# Patient Record
Sex: FEMALE | Race: WHITE | NOT HISPANIC OR LATINO | Employment: OTHER | URBAN - METROPOLITAN AREA
[De-identification: names, ages, dates, MRNs, and addresses within clinical notes are randomized per-mention and may not be internally consistent; named-entity substitution may affect disease eponyms.]

---

## 2022-07-27 ENCOUNTER — HOSPITAL ENCOUNTER (OUTPATIENT)
Dept: RADIOLOGY | Facility: MEDICAL CENTER | Age: 67
End: 2022-07-27
Attending: PHYSICIAN ASSISTANT
Payer: MEDICARE

## 2022-07-27 ENCOUNTER — OFFICE VISIT (OUTPATIENT)
Dept: URGENT CARE | Facility: PHYSICIAN GROUP | Age: 67
End: 2022-07-27
Payer: MEDICARE

## 2022-07-27 VITALS
DIASTOLIC BLOOD PRESSURE: 84 MMHG | BODY MASS INDEX: 23.39 KG/M2 | WEIGHT: 132 LBS | HEART RATE: 87 BPM | TEMPERATURE: 97.2 F | SYSTOLIC BLOOD PRESSURE: 122 MMHG | OXYGEN SATURATION: 96 % | RESPIRATION RATE: 18 BRPM | HEIGHT: 63 IN

## 2022-07-27 DIAGNOSIS — M25.471 RIGHT ANKLE SWELLING: ICD-10-CM

## 2022-07-27 PROCEDURE — 99203 OFFICE O/P NEW LOW 30 MIN: CPT | Performed by: PHYSICIAN ASSISTANT

## 2022-07-27 PROCEDURE — 73610 X-RAY EXAM OF ANKLE: CPT | Mod: RT

## 2022-07-27 RX ORDER — DULOXETIN HYDROCHLORIDE 20 MG/1
20 CAPSULE, DELAYED RELEASE ORAL DAILY
COMMUNITY
End: 2022-08-18

## 2022-07-27 ASSESSMENT — ENCOUNTER SYMPTOMS
CHILLS: 0
FALLS: 0
NAUSEA: 0
FEVER: 0
VOMITING: 0
MYALGIAS: 0

## 2022-07-28 ENCOUNTER — TELEPHONE (OUTPATIENT)
Dept: URGENT CARE | Facility: PHYSICIAN GROUP | Age: 67
End: 2022-07-28
Payer: MEDICARE

## 2022-07-28 NOTE — TELEPHONE ENCOUNTER
I spoke with patient's  as well as patient's twin sister, Sherita Hills, this afternoon. Pt gave permission for me to speak with her sister. We reviewed the x-ray results which are unremarkable.      After further discussion Sherita relates that Sussy's health has been declining steadily for the past 10 years.  She has been experiencing persistent bloating and issues with her digestive tract.  Patient has not been previously diagnosed with a movement disorder, but Sherita states movement disturbance started 3 years ago and has been gradually worsening.    Sherita indicates that last week Sussy was experiencing bilateral lower extremity swelling as well as some pain as she was concerned that this was a sign of a more serious pathology.  These symptoms have since resolved and there were no signs of CHF, DVT on my exam yesterday.    Patient's referral has been processed and she is scheduled to follow-up with primary care on 8/5/2022 at 1:20 PM.  I asked that Sherita attend this appointment with Sussy if possible.  We also reviewed red flag signs and symptoms and ED precautions.

## 2022-07-28 NOTE — PROGRESS NOTES
"Subjective:   Sussy Platt is a 67 y.o. female who presents for Ankle Injury (Swollen right ankle)        Patient presents with her  who contributes to history.  Patient presents with concerns of right ankle swelling that began 4 days ago.  Symptoms are not worsening but are also not improving.  She does not recall twisting or injuring the ankle.  She states that it is not painful to walk on.  She denies swelling in her lower legs and pain in her lower legs.  Denies nausea, vomiting, fevers, chills, chest pain, pain with breathing denies similar symptoms in the past.  She is not taking any medications for symptoms.      Review of Systems   Constitutional: Negative for chills and fever.   Gastrointestinal: Negative for nausea and vomiting.   Musculoskeletal: Negative for falls, joint pain and myalgias.       PMH:  has no past medical history on file.  MEDS:   Current Outpatient Medications:   •  QUEtiapine Fumarate (SEROQUEL PO), Take  by mouth., Disp: , Rfl:   •  DULoxetine (CYMBALTA) 20 MG Cap DR Particles, Take 20 mg by mouth every day., Disp: , Rfl:   ALLERGIES: Not on File  SURGHX: History reviewed. No pertinent surgical history.  SOCHX:  reports that she has quit smoking. She has never used smokeless tobacco. She reports that she does not drink alcohol and does not use drugs.  FH: Family history was reviewed, no pertinent findings to report   Objective:   /84   Pulse 87   Temp 36.2 °C (97.2 °F) (Temporal)   Resp 18   Ht 1.6 m (5' 3\")   Wt 59.9 kg (132 lb)   SpO2 96%   BMI 23.38 kg/m²   Physical Exam  Vitals reviewed.   Constitutional:       General: She is not in acute distress.     Appearance: Normal appearance. She is well-developed. She is not toxic-appearing.   HENT:      Head: Normocephalic and atraumatic.      Right Ear: External ear normal.      Left Ear: External ear normal.      Nose: Nose normal.   Cardiovascular:      Rate and Rhythm: Normal rate and regular rhythm. "   Pulmonary:      Effort: Pulmonary effort is normal. No respiratory distress.      Breath sounds: No stridor.   Musculoskeletal:      Comments: Right ankle/foot:  Appearance: Minimal soft tissue edema of the lateral right ankle. No bruising, erythema, or deformity appreciated  Palpation: No TTP along medial malleolus or deltoid ligament.  No TTP of lateral malleolus, ATFL, CFL, or PTFL.  No TTP along midfoot, base of the 5th metatarsal, MTP joints, or toes.   ROM: FROM throughout  Neurovascular: 2+ dorsalis pedis and posterior tibial.  Sensation intact and equal bilaterally    Lower legs are nonedematous and nonerythematous.  Lower legs are nontender to palpation bilaterally.  Even circumference.     Skin:     General: Skin is dry.   Neurological:      Comments: Alert and oriented. Dystonic movements- baseline per pt. No slurred speech or facial droop.         Imaging:    FINDINGS:  Bone mineralization is normal. There is no evidence of fracture or dislocation. There is no soft tissue swelling seen. The ankle mortise is well-maintained.        IMPRESSION:     No evidence of acute fracture or dislocation.  Assessment/Plan:   1. Right ankle swelling  - DX-ANKLE 3+ VIEWS RIGHT; Future  - Referral to establish with [facility] PCP    Other orders  - QUEtiapine Fumarate (SEROQUEL PO); Take  by mouth.  - DULoxetine (CYMBALTA) 20 MG Cap DR Particles; Take 20 mg by mouth every day.    Edema is very minimal.  Fortunately there is no sign of fracture or dislocation on imaging.  This does not look like CHF.  This does not look like a DVT.  Cause is unclear.  We will have patient follow-up with PCP for further evaluation and management.  Patient unsure if PCP is still practicing-referral placed to establish with new PCP if necessary.      ** mentions as he is leaving that he believes patient's behavior has been abnormal lately.  He believes that her speech has changed at times.  Suspects this is been ongoing for the past  several days.  Patient states that she is feeling well and she has not noticed changes in her speech, cognition, the way that she is walking or talking.  She denies headaches and palpitations. No CP, SOB. Patient and  advised that I recommend that patient be evaluated in the ED with any changes in speech, cognitive changes, motor or sensory changes, severe headaches, vision changes etc. We reviewed locations of local emergency rooms.    Differential diagnosis, natural history, supportive care, and indications for immediate follow-up discussed.    This does not include time spent on separately billable procedures/tests.

## 2022-07-28 NOTE — TELEPHONE ENCOUNTER
----- Message from Olivia Lopez, Med Ass't sent at 7/28/2022  1:42 PM PDT -----  Regarding: X Ray Results  Patient's spouse Michael called today 7/28/2022 at 1:30pm.  He stated they missed a call from you last night and would like a call back with patient's X-Ray results.

## 2022-08-05 ENCOUNTER — OFFICE VISIT (OUTPATIENT)
Dept: MEDICAL GROUP | Facility: PHYSICIAN GROUP | Age: 67
End: 2022-08-05
Attending: PHYSICIAN ASSISTANT
Payer: MEDICARE

## 2022-08-05 VITALS
DIASTOLIC BLOOD PRESSURE: 72 MMHG | WEIGHT: 135 LBS | OXYGEN SATURATION: 91 % | TEMPERATURE: 97.6 F | SYSTOLIC BLOOD PRESSURE: 126 MMHG | HEART RATE: 90 BPM | HEIGHT: 63 IN | BODY MASS INDEX: 23.92 KG/M2

## 2022-08-05 DIAGNOSIS — K86.1 CHRONIC PANCREATITIS, UNSPECIFIED PANCREATITIS TYPE (HCC): ICD-10-CM

## 2022-08-05 DIAGNOSIS — F31.9 BIPOLAR AFFECTIVE DISORDER, REMISSION STATUS UNSPECIFIED (HCC): ICD-10-CM

## 2022-08-05 DIAGNOSIS — Z76.89 ENCOUNTER TO ESTABLISH CARE: ICD-10-CM

## 2022-08-05 PROCEDURE — 99214 OFFICE O/P EST MOD 30 MIN: CPT | Performed by: NURSE PRACTITIONER

## 2022-08-05 RX ORDER — PANCRELIPASE 24000; 76000; 120000 [USP'U]/1; [USP'U]/1; [USP'U]/1
CAPSULE, DELAYED RELEASE PELLETS ORAL
COMMUNITY
End: 2022-11-03 | Stop reason: SDUPTHER

## 2022-08-05 RX ORDER — QUETIAPINE FUMARATE 300 MG/1
300 TABLET, FILM COATED ORAL 2 TIMES DAILY
COMMUNITY

## 2022-08-05 RX ORDER — DULOXETIN HYDROCHLORIDE 60 MG/1
CAPSULE, DELAYED RELEASE ORAL
COMMUNITY
Start: 2022-08-04

## 2022-08-05 RX ORDER — OMEPRAZOLE 20 MG/1
CAPSULE, DELAYED RELEASE ORAL
COMMUNITY
Start: 2022-08-04 | End: 2022-11-03 | Stop reason: SDUPTHER

## 2022-08-05 RX ORDER — ALBUTEROL SULFATE 90 UG/1
AEROSOL, METERED RESPIRATORY (INHALATION)
COMMUNITY
Start: 2022-07-16 | End: 2022-08-18

## 2022-08-05 NOTE — PROGRESS NOTES
Subjective:     CC:    Chief Complaint   Patient presents with   • Establish Care   • Abdominal Pain     Abd bloating for 10 years         HISTORY OF THE PRESENT ILLNESS: Patient is a 67 y.o. female, here today to establish care. Prior PCP was Dr Pearson in [place]. Her  and sister are also present for visit and add to history. Active concern today is abdominal bloating, nausea.     The below problems were discussed/reviewed at this visit:    Problem   Chronic Pancreatitis (Hcc)    States diagnosed pancreatitis about 7 years ago; currently on Creon TID, Omeprazole 20mg QD;   Continues to have symptoms of bloating, nausea, RUQ intermittent pain. Has seen several GI specialist ([place], [place], [place]). recalls EGD last year in [place]; colonoscopy some years ago in [place].  Denies alcohol/drug use currently; she is requesting further work up on persisting symptoms  - continue current medications as prescribed by GI  - new GI referral placed today  - request records from prior visits     Bipolar Disorder (Hcc)    States seeing Psychiatrist in [place] for the past 4 years; On daily cymbalta and nightly seroquel (unsure of dosing). States was told she is bipolar. There is mention of meth/alcohol use in the past. Unclear if sobriety is maintained.  - get records   - she would like to continue treatment with current psychiatrist         Patient Active Problem List   Diagnosis   • Chronic pancreatitis (HCC)   • Bipolar disorder (HCC)       No past medical history on file.     Current Outpatient Medications Ordered in Epic   Medication Sig Dispense Refill   • albuterol 108 (90 Base) MCG/ACT Aero Soln inhalation aerosol      • omeprazole (PRILOSEC) 20 MG delayed-release capsule      • quetiapine (SEROQUEL) 300 MG tablet Take 300 mg by mouth 2 times a day.     • pancrelipase, Lip-Prot-Amyl, (CREON) 05557-43824 units Cap DR Particles Take  by mouth 3 times a day with meals.     • QUEtiapine Fumarate (SEROQUEL PO)  "Take  by mouth.     • DULoxetine (CYMBALTA) 60 MG Cap DR Particles delayed-release capsule      • DULoxetine (CYMBALTA) 20 MG Cap DR Particles Take 20 mg by mouth every day.       No current Epic-ordered facility-administered medications on file.        No past surgical history on file.     Allergies:  Patient has no known allergies.    Health Maintenance: deferred for next visit    ROS:   Review of Systems   Constitutional: Negative.  Negative for fever and malaise/fatigue.   HENT: Negative.    Eyes: Negative.    Respiratory: Negative for cough, sputum production and shortness of breath.    Cardiovascular: Negative for chest pain, palpitations and leg swelling.   Gastrointestinal: Positive for abdominal pain and nausea.        Bloating   Genitourinary: Negative.    Musculoskeletal: Negative.    Neurological: Negative.    Endo/Heme/Allergies: Negative.    Psychiatric/Behavioral: Negative.        Objective:     Exam: /72 (BP Location: Left arm, Patient Position: Sitting, BP Cuff Size: Adult)   Pulse 90   Temp 36.4 °C (97.6 °F) (Temporal)   Ht 1.6 m (5' 3\")   Wt 61.2 kg (135 lb)   SpO2 91%  Body mass index is 23.91 kg/m².    Physical Exam  Constitutional:       Appearance: Normal appearance.   Cardiovascular:      Rate and Rhythm: Normal rate and regular rhythm.      Pulses: Normal pulses.      Heart sounds: Normal heart sounds.   Pulmonary:      Effort: Pulmonary effort is normal.      Breath sounds: Normal breath sounds.   Abdominal:      General: There is distension.      Palpations: There is no mass.      Tenderness: There is abdominal tenderness. There is no guarding or rebound.      Hernia: No hernia is present.      Comments: Tender deep palpation right abdomen   Musculoskeletal:         General: Normal range of motion.      Cervical back: Normal range of motion and neck supple.   Skin:     General: Skin is warm and dry.   Neurological:      General: No focal deficit present.      Mental Status: She " is alert and oriented to person, place, and time.   Psychiatric:         Mood and Affect: Mood normal.         Behavior: Behavior normal.         Thought Content: Thought content normal.         Judgment: Judgment normal.         Assessment & Plan:   67 y.o. female with the following -    Problem List Items Addressed This Visit     Chronic pancreatitis (HCC)    Relevant Medications    omeprazole (PRILOSEC) 20 MG delayed-release capsule    pancrelipase, Lip-Prot-Amyl, (CREON) 48095-85979 units Cap DR Particles    Other Relevant Orders    Referral to Gastroenterology    Bipolar disorder (HCC)      Other Visit Diagnoses     Encounter to establish care            Return in about 1 week (around 8/12/2022) for review external records, order labs, GI f-up, COPD.    Please note that this dictation was created using voice recognition software. I have made every reasonable attempt to correct obvious errors, but I expect that there are errors of grammar and possibly content that I did not discover before finalizing the note.

## 2022-08-06 PROBLEM — F31.9 BIPOLAR DISORDER (HCC): Status: ACTIVE | Noted: 2022-08-06

## 2022-08-06 PROBLEM — K86.1 CHRONIC PANCREATITIS (HCC): Status: ACTIVE | Noted: 2022-08-06

## 2022-08-06 ASSESSMENT — ENCOUNTER SYMPTOMS
PSYCHIATRIC NEGATIVE: 1
MUSCULOSKELETAL NEGATIVE: 1
CONSTITUTIONAL NEGATIVE: 1
COUGH: 0
EYES NEGATIVE: 1
SHORTNESS OF BREATH: 0
NAUSEA: 1
ABDOMINAL PAIN: 1
ROS GI COMMENTS: BLOATING
NEUROLOGICAL NEGATIVE: 1
FEVER: 0
SPUTUM PRODUCTION: 0
PALPITATIONS: 0

## 2022-08-18 ENCOUNTER — OFFICE VISIT (OUTPATIENT)
Dept: MEDICAL GROUP | Facility: PHYSICIAN GROUP | Age: 67
End: 2022-08-18
Payer: MEDICARE

## 2022-08-18 VITALS
WEIGHT: 130 LBS | HEART RATE: 73 BPM | BODY MASS INDEX: 23.04 KG/M2 | HEIGHT: 63 IN | OXYGEN SATURATION: 95 % | DIASTOLIC BLOOD PRESSURE: 88 MMHG | TEMPERATURE: 98.7 F | SYSTOLIC BLOOD PRESSURE: 128 MMHG

## 2022-08-18 DIAGNOSIS — K86.1 CHRONIC PANCREATITIS, UNSPECIFIED PANCREATITIS TYPE (HCC): ICD-10-CM

## 2022-08-18 DIAGNOSIS — J44.9 CHRONIC OBSTRUCTIVE PULMONARY DISEASE, UNSPECIFIED COPD TYPE (HCC): ICD-10-CM

## 2022-08-18 DIAGNOSIS — G43.009 MIGRAINE WITHOUT AURA AND WITHOUT STATUS MIGRAINOSUS, NOT INTRACTABLE: ICD-10-CM

## 2022-08-18 DIAGNOSIS — Z12.31 BREAST CANCER SCREENING BY MAMMOGRAM: ICD-10-CM

## 2022-08-18 PROCEDURE — 99214 OFFICE O/P EST MOD 30 MIN: CPT | Performed by: NURSE PRACTITIONER

## 2022-08-18 RX ORDER — ALBUTEROL SULFATE 90 UG/1
2 AEROSOL, METERED RESPIRATORY (INHALATION) EVERY 4 HOURS PRN
Qty: 1 EACH | Refills: 2 | Status: SHIPPED | OUTPATIENT
Start: 2022-08-18 | End: 2022-10-24

## 2022-08-18 RX ORDER — RIZATRIPTAN BENZOATE 10 MG/1
10 TABLET ORAL
Qty: 10 TABLET | Refills: 1 | Status: SHIPPED | OUTPATIENT
Start: 2022-08-18 | End: 2022-11-03 | Stop reason: SDUPTHER

## 2022-08-18 RX ORDER — FLUTICASONE PROPIONATE AND SALMETEROL 250; 50 UG/1; UG/1
1 POWDER RESPIRATORY (INHALATION) EVERY 12 HOURS
Qty: 60 EACH | Refills: 1 | Status: SHIPPED | OUTPATIENT
Start: 2022-08-18 | End: 2022-08-19

## 2022-08-18 NOTE — PROGRESS NOTES
Subjective:     CC:   Chief Complaint   Patient presents with    COPD    Headache     Requesting Rizatriptan RX         HPI:   Patient is a 67 y.o. female here today with sister and  for evaluation and management of:    Problem   Chronic Obstructive Pulmonary Disease (Hcc)    Reports chronic COPD >10 years; smoking since age 15; does not recall daily inhaler from the past; currently using albuterol 2x/day;   Hospitalized 3/29/2022 for pna; discharged on home oxygen which she uses only at night; 1.5-2L;   She is still smoking, trying to quit     Migraine Without Aura and Without Status Migrainosus, Not Intractable    Reports Mirgraines for several years; whole head hurts with onset; no aura before; sometimes it affects eye sight; had 1 migraine last week & yesterday; took sisters Maxalt with advil & head ache ceased. States she had prescription for Maxalt before and this has helped in the past.     Chronic Pancreatitis (Hcc)    HPI from 8/6/2022 visit: States diagnosed pancreatitis about 7 years ago; currently on Creon TID, Omeprazole 20mg QD;   Continues to have symptoms of bloating, nausea, RUQ intermittent pain. Has seen several GI specialist ([place], [place], [place]). recalls EGD last year in [place]; colonoscopy some years ago in [place].  Denies alcohol/drug use currently; she is requesting further work up on persisting symptoms  - continue current medications as prescribed by GI  - new GI referral placed today  - request records from prior visits  ------  Records from GI consultants, digestive health reviewed; noted IBS;   New referral approved for her to see GI consultants; information provided to patient today so she can make appointment         Patient Active Problem List   Diagnosis    Chronic pancreatitis (HCC)    Bipolar disorder (HCC)    Chronic obstructive pulmonary disease (HCC)    Migraine without aura and without status migrainosus, not intractable     No past medical history on file.  "    No past surgical history on file.     Current Outpatient Medications on File Prior to Visit   Medication Sig Dispense Refill    DULoxetine (CYMBALTA) 60 MG Cap DR Particles delayed-release capsule       omeprazole (PRILOSEC) 20 MG delayed-release capsule       quetiapine (SEROQUEL) 300 MG tablet Take 300 mg by mouth 2 times a day.      pancrelipase, Lip-Prot-Amyl, (CREON) 38231-13697 units Cap DR Particles Take  by mouth 3 times a day with meals.      QUEtiapine Fumarate (SEROQUEL PO) Take  by mouth.       No current facility-administered medications on file prior to visit.        Health Maintenance: Completed    ROS:  Review of Systems   Constitutional: Negative.  Negative for fever and malaise/fatigue.   HENT: Negative.     Eyes: Negative.    Respiratory:  Negative for cough, sputum production and shortness of breath.    Cardiovascular:  Negative for chest pain, palpitations and leg swelling.   Gastrointestinal:         Persisting abdominal bloating   Genitourinary: Negative.    Musculoskeletal: Negative.    Neurological: Negative.    Endo/Heme/Allergies: Negative.    Psychiatric/Behavioral: Negative.       Objective:     Exam:  /88   Pulse 73   Temp 37.1 °C (98.7 °F) (Temporal)   Ht 1.6 m (5' 3\")   Wt 59 kg (130 lb)   SpO2 95%   BMI 23.03 kg/m²  Body mass index is 23.03 kg/m².    Physical Exam    Labs: reviewed labs from external ER visits 3-4/2022 (MEDIA)    Assessment & Plan:     67 y.o. female with the following -     Problem List Items Addressed This Visit       Chronic pancreatitis (HCC)    Chronic obstructive pulmonary disease (HCC)     Symptoms not currently managed; needing albuterol 2x/day; reports dyspnea with walking long distance (from car into clinic), going up stairs; no cough, fever, CP today; BS are clear on exam  - will add Wixela 250-50 1puff BID;  - continue prn albuterol (refilled today)  - PFTs ordered  - referral placed to see pulmonologist  - ongoing smoking cessation " counseling         Relevant Medications    albuterol 108 (90 Base) MCG/ACT Aero Soln inhalation aerosol    fluticasone-salmeterol (WIXELA INHUB) 250-50 MCG/ACT AEROSOL POWDER, BREATH ACTIVATED    Other Relevant Orders    PULMONARY FUNCTION TESTS -Test requested: Complete Pulmonary Function Test (COPD >10 years)    Referral to Pulmonary and Sleep Medicine    Migraine without aura and without status migrainosus, not intractable     Chronic migraine, whole head; management with triptan and sometimes excedrine  - refilled Maxalt 10mg QD prn headache  - we can consider preventative therapy if frequency increases >2-3x/month         Relevant Medications    rizatriptan (MAXALT) 10 MG tablet     Other Visit Diagnoses       Breast cancer screening by mammogram        Relevant Orders    MA-SCREENING MAMMO BILAT W/TOMOSYNTHESIS W/CAD            Medications Prescribed Today:  1. Chronic obstructive pulmonary disease, unspecified COPD type (HCC)  - albuterol 108 (90 Base) MCG/ACT Aero Soln inhalation aerosol; Inhale 2 Puffs every four hours as needed for Shortness of Breath.  Dispense: 1 Each; Refill: 2  - fluticasone-salmeterol (WIXELA INHUB) 250-50 MCG/ACT AEROSOL POWDER, BREATH ACTIVATED; Inhale 1 Puff every 12 hours.  Dispense: 60 Each; Refill: 1    2. Migraine without aura and without status migrainosus, not intractable  - rizatriptan (MAXALT) 10 MG tablet; Take 1 Tablet by mouth one time as needed for Migraine for up to 1 dose.  Dispense: 10 Tablet; Refill: 1    Educated in proper administration of medication(s) ordered today including safety, possible SE, risks, benefits, rationale and alternatives to therapy.     Return in about 3 months (around 11/18/2022) for follow up on referrals-pulm, GI.    Please note that this dictation was created using voice recognition software. I have made every reasonable attempt to correct obvious errors, but I expect that there are errors of grammar and possibly content that I did not  discover before finalizing the note.

## 2022-08-19 PROBLEM — G43.009 MIGRAINE WITHOUT AURA AND WITHOUT STATUS MIGRAINOSUS, NOT INTRACTABLE: Status: ACTIVE | Noted: 2022-08-19

## 2022-08-19 PROBLEM — J44.9 CHRONIC OBSTRUCTIVE PULMONARY DISEASE (HCC): Status: ACTIVE | Noted: 2022-08-19

## 2022-08-19 RX ORDER — FLUTICASONE PROPIONATE AND SALMETEROL 250; 50 UG/1; UG/1
1 POWDER RESPIRATORY (INHALATION) EVERY 12 HOURS
Qty: 60 EACH | Refills: 1 | Status: SHIPPED | OUTPATIENT
Start: 2022-08-19 | End: 2022-11-29

## 2022-08-19 ASSESSMENT — ENCOUNTER SYMPTOMS
SPUTUM PRODUCTION: 0
FEVER: 0
PALPITATIONS: 0
COUGH: 0
EYES NEGATIVE: 1
MUSCULOSKELETAL NEGATIVE: 1
SHORTNESS OF BREATH: 0
NEUROLOGICAL NEGATIVE: 1
CONSTITUTIONAL NEGATIVE: 1
PSYCHIATRIC NEGATIVE: 1

## 2022-08-19 NOTE — ASSESSMENT & PLAN NOTE
Chronic migraine, whole head; management with triptan and sometimes excedrine  - refilled Maxalt 10mg QD prn headache  - we can consider preventative therapy if frequency increases >2-3x/month

## 2022-08-19 NOTE — ASSESSMENT & PLAN NOTE
LVM   Symptoms not currently managed; needing albuterol 2x/day; reports dyspnea with walking long distance (from car into clinic), going up stairs; no cough, fever, CP today; BS are clear on exam  - will add Wixela 250-50 1puff BID;  - continue prn albuterol (refilled today)  - PFTs ordered  - referral placed to see pulmonologist  - ongoing smoking cessation counseling

## 2022-09-26 ENCOUNTER — NON-PROVIDER VISIT (OUTPATIENT)
Dept: URGENT CARE | Facility: PHYSICIAN GROUP | Age: 67
End: 2022-09-26
Payer: MEDICARE

## 2022-09-26 DIAGNOSIS — Z23 NEED FOR VACCINATION: ICD-10-CM

## 2022-09-26 PROCEDURE — 90662 IIV NO PRSV INCREASED AG IM: CPT | Performed by: FAMILY MEDICINE

## 2022-09-26 PROCEDURE — G0008 ADMIN INFLUENZA VIRUS VAC: HCPCS | Performed by: FAMILY MEDICINE

## 2022-09-26 NOTE — PROGRESS NOTES
"Sussy Paltt is a 67 y.o. female here for a non-provider visit for:   FLU    Reason for immunization: Annual Flu Vaccine  Immunization records indicate need for vaccine: Yes, confirmed with Epic  Minimum interval has been met for this vaccine: Yes  ABN completed: Yes    VIS Dated  8/6/21 was given to patient: Yes  All IAC Questionnaire questions were answered \"No.\"    Patient tolerated injection and no adverse effects were observed or reported: Yes    Pt scheduled for next dose in series: Not Indicated   "

## 2022-10-14 ENCOUNTER — TELEPHONE (OUTPATIENT)
Dept: SLEEP MEDICINE | Facility: MEDICAL CENTER | Age: 67
End: 2022-10-14
Payer: MEDICARE

## 2022-10-14 NOTE — TELEPHONE ENCOUNTER
VOICEMAIL  1. Caller Name: Sherita                       Call Back Number: [phone]    2. Message: needs to schedule pulmonary testing. Unsure which provider.     3. Patient approves office to leave a detailed voicemail/MyChart message: yes      Phone Number Called: [phone]    Call outcome:  I spoke to Sherita jones     Message: patient is scheduled to establish care with Dr. Contreras 1/24/23. Requesting a sooner appointment. Informed there is no sooner appointments. Current appointment is the soonest. Confirmed pt is on a wait list with all Pulm providers for a sooner appt if one becomes available. Confirm registation sister requested she be placed as a contact. She has already been.

## 2022-10-24 DIAGNOSIS — J44.9 CHRONIC OBSTRUCTIVE PULMONARY DISEASE, UNSPECIFIED COPD TYPE (HCC): ICD-10-CM

## 2022-10-24 RX ORDER — ALBUTEROL SULFATE 90 UG/1
2 AEROSOL, METERED RESPIRATORY (INHALATION) EVERY 4 HOURS PRN
Qty: 6.7 EACH | Refills: 2 | Status: SHIPPED | OUTPATIENT
Start: 2022-10-24 | End: 2023-01-31 | Stop reason: SDUPTHER

## 2022-11-03 ENCOUNTER — OFFICE VISIT (OUTPATIENT)
Dept: MEDICAL GROUP | Facility: PHYSICIAN GROUP | Age: 67
End: 2022-11-03
Payer: MEDICARE

## 2022-11-03 VITALS
WEIGHT: 138 LBS | BODY MASS INDEX: 26.06 KG/M2 | HEIGHT: 61 IN | TEMPERATURE: 95.5 F | SYSTOLIC BLOOD PRESSURE: 122 MMHG | DIASTOLIC BLOOD PRESSURE: 78 MMHG | OXYGEN SATURATION: 94 % | HEART RATE: 81 BPM

## 2022-11-03 DIAGNOSIS — K86.1 CHRONIC PANCREATITIS, UNSPECIFIED PANCREATITIS TYPE (HCC): ICD-10-CM

## 2022-11-03 DIAGNOSIS — G43.009 MIGRAINE WITHOUT AURA AND WITHOUT STATUS MIGRAINOSUS, NOT INTRACTABLE: ICD-10-CM

## 2022-11-03 DIAGNOSIS — A63.0 CONDYLOMA ACUMINATUM: ICD-10-CM

## 2022-11-03 PROCEDURE — 17110 DESTRUCTION B9 LES UP TO 14: CPT | Performed by: NURSE PRACTITIONER

## 2022-11-03 PROCEDURE — 99214 OFFICE O/P EST MOD 30 MIN: CPT | Mod: 25 | Performed by: NURSE PRACTITIONER

## 2022-11-03 RX ORDER — OMEPRAZOLE 20 MG/1
20 CAPSULE, DELAYED RELEASE ORAL DAILY
Qty: 90 CAPSULE | Refills: 0 | Status: SHIPPED | OUTPATIENT
Start: 2022-11-03 | End: 2023-02-03

## 2022-11-03 RX ORDER — PANCRELIPASE 24000; 76000; 120000 [USP'U]/1; [USP'U]/1; [USP'U]/1
1 CAPSULE, DELAYED RELEASE PELLETS ORAL
Qty: 180 CAPSULE | Refills: 0 | Status: SHIPPED | OUTPATIENT
Start: 2022-11-03 | End: 2023-04-04

## 2022-11-03 RX ORDER — RIZATRIPTAN BENZOATE 10 MG/1
10 TABLET ORAL
Qty: 10 TABLET | Refills: 2 | Status: SHIPPED | OUTPATIENT
Start: 2022-11-03 | End: 2023-03-08

## 2022-11-03 NOTE — PROGRESS NOTES
Subjective:     CC:   Chief Complaint   Patient presents with    Annual Exam    Nevus     Buttock, one in vagina x 4 months    Medication Refill     OMeprazole, Creon, Rizatriptan       HPI:   67 y.o. female here today for annual exam. She is feeling well and denies any complaints.    Ob-Gyn/ History:    Patient has GYN provider: {YES/NO:63}  /Para:  ***  Last Pap Smear:  ***. *** history of abnormal pap smears.  Gyn Surgery:  ***.  Current Contraceptive Method:  ***. *** currently sexually active.  Last menstrual period:  ***.  Periods regular. {Description; bleeding vaginal:97063} bleeding. Cramping is {MILD/MOD/SEVERE/VARIABLE:48854}.   She {DOES/DOES NOT:49563} take OTC analgesics for cramps.  No significant bloating/fluid retention, pelvic pain, or dyspareunia. No vaginal discharge  Post-menopausal bleeding: ***  Urinary incontinence: ***  Folate intake: *** {F childbearing age folate 0.4-0.8mg daily. USPSTF: A}    Health Maintenance  Advanced directive: *** {age >=65}  Osteoporosis Screen/ DEXA: *** {once 65 + or risk factors, frax tool}  PT/vit D for falls prevention: *** {65+ if higher risk for falls}  Cholesterol Screening: *** {45+ yo, q5yr}  Diabetes Screening: *** {40-71 yo who are overweight or obese}  Aspirin Use: ***  {50-59 for the primary prevention of cardiovascular disease (CVD) and colorectal cancer (CRC) if: 10% or greater 10-year CVD risk, not at increased risk for bleeding, life expectancy >10 years, and willing to take low-dose aspirin daily for 10 years. USPSTF: B}  Diet: *** {BMI ?25 diet & physical activity counseling, BMI ?30 offer or referral for intervention. USPSTF: B}  Exercise: *** {BMI ?25 diet & physical activity counseling, BMI ?30 offer or referral for intervention. USPSTF: B}  Substance Abuse: *** {counseling if alcohol misuse. USPSTF: B}  Safe in relationship. {F childbearing age intimate partner violence screening. USPSTF: B}  Seat belts, bike helmet, gun safety  discussed.  Sun protection used.    Cancer screening  Colorectal Cancer Screening: ***  {50-75 yearly FIT testing, colonoscopy every 10 years, or flex sig every 5 years. USPSTF: A. 45-75 if /. PN Policy}  Lung Cancer Screening: ***  {50-80 yearly screen with 30 pack-years, currently smoke or quit <15 years. USPSTF: B}  Cervical Cancer Screening: *** {21-65 every 3-5 years if nl screening. USPSTF: A}  Breast Cancer Screening: *** {50-75 mammography every 2 years. USPSTF: B.  For age 40-50: out of 1000 women, 1 with BC saved.  2 will be dx/tx that wouldn't have caused problems or needed tx. 67 with normal bx. 576 with false positive mammo}    Infectious disease screening/Immunizations  --STI Screening: *** {GC/CT F16-24 yearly, USPSTF: B/A}  --Practices safe sex.  --HIV Screening: *** {15-65 once. USPSTF: A}  --Hepatitis C Screening: *** {18-79 once. USPSTF: B}  --Immunizations:    Influenza: *** {yearly}   HPV:  *** {F18-26, M18-21, M to 26 may be vaccinated, 3 doses}   Tetanus: *** {every ten years with at least one tdap}   Shingles: n/a {once, >50, should call insurance or go through pharmacy}   Pneumococcal : ***      {[id number], categories: (updated 7/13/2015)   1. Chronic heart/lung/liver disease, DM, alcoholism, cigarette smoking =>   recommend PPSV23 (Pneumovax) only    --give PPSV23   2. CSF leak, cochlear implant => recommend both vaccines    --if previous PPSV23, give PCV13 (Prevnar) ?1yr after PPSV23    --if neither, give PCV13, then PPSV23 ?8wk later   3. Sickle disease/hemoglobinopathies, asplenia, immunodeficiencies, HIV,    CKD, leukemia, lymphoma/Hodgkin, malignancy, iatrogenic immunodef,   solid organ transplant, mult myeloma => recommend both vaccines plus   2nd PPSV23    --if previous PPSV23, give PCV13 ?1yr after PPSV23    --give 2nd PPSV23 ?8wk after PCV13 and ?5yr after previous PPSV23    --if none previously, give PCV13 now and give PPSV23 ?8wk later    --give  PPSV23 ?5yr after previous PPSV23. CDC/ACIP}       {age>=65:   1. Neither vaccine or unknown hx => recommend both vaccines    --PCV13 (Prevnar) now, PPSV23 (Pneumovax) ?1yr later   2. PPSV23 before age 65, no PCV13 => recommend both vaccines    --PCV ?1yr after PPSV23    --2nd PPSV23 ?1yr after PCV13 and ?5yr after previous PPSV23   3. PPSV23 after age 65, no PCV13 => recommend PCV13 only    --PVC13 ?1yr after PPSV23   4. PCV13 and PPSV23 before age 65 => recommend PPSV23 only    --PPSV23 ?1yr after PCV13 and ?5 years after previous PPSV23   5. PCV13 and PPSV23 after age 65 => done! CDC/ACIP}   COVID-19: ***  Other immunizations: *** {Consider meningococcal, hepatitis A, hepatitis B, HIB.}    She  has no past medical history on file.  She  has no past surgical history on file.    No family history on file.    Social History     Socioeconomic History    Marital status:      Spouse name: Not on file    Number of children: Not on file    Years of education: Not on file    Highest education level: Not on file   Occupational History    Not on file   Tobacco Use    Smoking status: Some Days     Types: Cigarettes    Smokeless tobacco: Never   Vaping Use    Vaping Use: Never used   Substance and Sexual Activity    Alcohol use: Yes    Drug use: Never    Sexual activity: Not Currently   Other Topics Concern    Not on file   Social History Narrative    Not on file     Social Determinants of Health     Financial Resource Strain: Not on file   Food Insecurity: Not on file   Transportation Needs: Not on file   Physical Activity: Not on file   Stress: Not on file   Social Connections: Not on file   Intimate Partner Violence: Not on file   Housing Stability: Not on file       Patient Active Problem List    Diagnosis Date Noted    Chronic obstructive pulmonary disease (HCC) 08/19/2022    Migraine without aura and without status migrainosus, not intractable 08/19/2022    Chronic pancreatitis (HCC) 08/06/2022    Bipolar  "disorder ([facility]) 08/06/2022         Current Outpatient Medications   Medication Sig Dispense Refill    albuterol 108 (90 Base) MCG/ACT Aero Soln inhalation aerosol INHALE 2 PUFFS EVERY FOUR HOURS AS NEEDED FOR SHORTNESS OF BREATH. 6.7 Each 2    fluticasone-salmeterol (WIXELA INHUB) 250-50 MCG/ACT AEROSOL POWDER, BREATH ACTIVATED Inhale 1 Puff every 12 hours. 60 Each 1    rizatriptan (MAXALT) 10 MG tablet Take 1 Tablet by mouth one time as needed for Migraine for up to 1 dose. 10 Tablet 1    DULoxetine (CYMBALTA) 60 MG Cap DR Particles delayed-release capsule       omeprazole (PRILOSEC) 20 MG delayed-release capsule       quetiapine (SEROQUEL) 300 MG tablet Take 300 mg by mouth 2 times a day.      pancrelipase, Lip-Prot-Amyl, (CREON) 78655-41117 units Cap DR Particles Take  by mouth 3 times a day with meals.      QUEtiapine Fumarate (SEROQUEL PO) Take  by mouth.       No current facility-administered medications for this visit.     No Known Allergies    Review of Systems ***  Constitutional: Negative for fever, chills and malaise/fatigue.   HENT: Negative for congestion.    Eyes: Negative for pain.    Respiratory: Negative for cough and shortness of breath.  Cardiovascular: Negative for leg swelling.   Gastrointestinal: Negative for nausea, vomiting, abdominal pain and diarrhea.   Genitourinary: Negative for dysuria and hematuria.   Skin: Negative for rash.   Neurological: Negative for dizziness, focal weakness and headaches.   Endo/Heme/Allergies: Does not bleed easily.   Psychiatric/Behavioral: Negative for depression.  The patient is not nervous/anxious.      Objective:     /78 (BP Location: Left arm, Patient Position: Sitting, BP Cuff Size: Adult)   Pulse 81   Temp (!) 35.3 °C (95.5 °F) (Temporal)   Ht 1.555 m (5' 1.22\")   Wt 62.6 kg (138 lb)   SpO2 94%   BMI 25.89 kg/m²   Body mass index is 25.89 kg/m².  Wt Readings from Last 4 Encounters:   11/03/22 62.6 kg (138 lb)   08/18/22 59 kg (130 lb) "   08/05/22 61.2 kg (135 lb)   07/27/22 59.9 kg (132 lb)       Physical Exam:  Constitutional: Well-developed and well-nourished. Not diaphoretic. No distress.   Skin: Skin is warm and dry. No rash noted.  Head: Atraumatic without lesions.  Eyes: Conjunctivae and extraocular motions are normal. Pupils are equal, round, and reactive to light. No scleral icterus.   Ears:  External ears unremarkable. Tympanic membranes clear and intact.  Nose: Nares patent. Septum midline. Turbinates without erythema nor edema. No discharge.   Mouth/Throat: Dentition is ***. Tongue normal. Oropharynx is clear and moist. Posterior pharynx without erythema or exudates.  Neck: Supple, trachea midline. Normal range of motion. No thyromegaly present. No lymphadenopathy--cervical or supraclavicular.  Cardiovascular: Regular rate and rhythm, S1 and S2 without murmur, rubs, or gallops.  Lungs: Normal inspiratory effort, CTA bilaterally, no wheezes/rhonchi/rales  Breast: Breasts examined seated and supine. No skin changes, peau d'orange or nipple retraction. No discharge. No axillary or supraclavicular adenopathy. No masses or nodularity palpable. ***  Abdomen: Soft, non tender, and without distention. Active bowel sounds in all four quadrants. No rebound, guarding, masses or HSM.  :Perineum and external genitalia normal without rash. Vagina with {GYN VAGINAL DISCHARGE:721} discharge. Cervix without visible lesions or discharge. Bimanual exam without adnexal masses or cervical motion tenderness. Specimen collected from transformation zone ***  Extremities: No cyanosis, clubbing, erythema, nor edema. Distal pulses intact and symmetric.   Musculoskeletal: All major joints AROM full in all directions without pain.  Neurological: Alert and oriented x 3. DTRs 2+/3 and symmetric. No cranial nerve deficit. 5/5 myotomes. Sensation intact.   Psychiatric:  Behavior, mood, and affect are appropriate.    A chaperone was offered to the patient during  today's exam. {CHAPERONE:35385}    Assessment and Plan:     1. Migraine without aura and without status migrainosus, not intractable            Health Care Maintenance:  ***   Labs per orders  Immunizations per orders  Patient counseled about skin care, diet, supplements, prenatal vitamins, safe sex and exercise.      Follow-up: No follow-ups on file.

## 2022-11-03 NOTE — PROGRESS NOTES
Subjective:     CC:   Chief Complaint   Patient presents with    Annual Exam    Nevus     Buttock, one in vagina x 4 months    Medication Refill     OMeprazole, Creon, Rizatriptan        HPI:   Patient is a 67 y.o. established female patient with medical history listed below here today for refill on medications and new concern with lesion around buttock area. She was scheduled for annual wellness visit today but we will move this to another date to accommodate her new concerns today.     Problem   Condyloma Acuminatum    She noticed wart around left rectal area some months ago, she feels it when she is sitting down and sometimes it catches on toilet paper evy she wipes.   Hx vaginal wart in the past, not present today.     Migraine Without Aura and Without Status Migrainosus, Not Intractable    Reports Mirgraines for several years; whole head hurts with onset; no aura before; sometimes it affects eye sight; had 1 migraine last week & yesterday; took sisters Maxalt with advil & head ache ceased.   Using prn Maxalt 2-3x/month; occasional Excedrin for milder headaches      Chronic Pancreatitis (Hcc)    HPI from 8/6/2022 visit: States diagnosed pancreatitis about 7 years ago; currently on Creon TID, Omeprazole 20mg QD;   Continues to have symptoms of bloating, nausea, RUQ intermittent pain. Has seen several GI specialist ([place], [place], [place]). recalls EGD last year in [place]; colonoscopy some years ago in [place].  Denies alcohol/drug use currently; she is requesting further work up on persisting symptoms  - continue current medications as prescribed by GI  - new GI referral placed today  - request records from prior visits  ------  Records from GI consultants, digestive health reviewed; noted IBS;   New referral approved for her to see GI consultants; information provided to patient today so she can make appointment         Patient Active Problem List   Diagnosis    Chronic pancreatitis (HCC)    Bipolar  "disorder (HCC)    Chronic obstructive pulmonary disease (HCC)    Migraine without aura and without status migrainosus, not intractable    Condyloma acuminatum       History reviewed. No pertinent past medical history.     History reviewed. No pertinent surgical history.     Current Outpatient Medications on File Prior to Visit   Medication Sig Dispense Refill    albuterol 108 (90 Base) MCG/ACT Aero Soln inhalation aerosol INHALE 2 PUFFS EVERY FOUR HOURS AS NEEDED FOR SHORTNESS OF BREATH. 6.7 Each 2    fluticasone-salmeterol (WIXELA INHUB) 250-50 MCG/ACT AEROSOL POWDER, BREATH ACTIVATED Inhale 1 Puff every 12 hours. 60 Each 1    DULoxetine (CYMBALTA) 60 MG Cap DR Particles delayed-release capsule       quetiapine (SEROQUEL) 300 MG tablet Take 300 mg by mouth 2 times a day.      QUEtiapine Fumarate (SEROQUEL PO) Take  by mouth.       No current facility-administered medications on file prior to visit.        ROS: per HPI    Objective:     Exam:  /78 (BP Location: Left arm, Patient Position: Sitting, BP Cuff Size: Adult)   Pulse 81   Temp (!) 35.3 °C (95.5 °F) (Temporal)   Ht 1.555 m (5' 1.22\")   Wt 62.6 kg (138 lb)   SpO2 94%   BMI 25.89 kg/m²  Body mass index is 25.89 kg/m².    Physical Exam  Constitutional:       Appearance: Normal appearance.   Cardiovascular:      Rate and Rhythm: Normal rate and regular rhythm.      Pulses: Normal pulses.      Heart sounds: Normal heart sounds.   Pulmonary:      Effort: Pulmonary effort is normal.      Breath sounds: Normal breath sounds.   Genitourinary:         Comments: Slightly raised dark colored wart measuring <5mm  Musculoskeletal:      Right lower leg: No edema.      Left lower leg: No edema.   Skin:     General: Skin is warm and dry.   Neurological:      General: No focal deficit present.      Mental Status: She is alert and oriented to person, place, and time.       Assessment & Plan:     67 y.o. female with the following -     Problem List Items Addressed " This Visit       Chronic pancreatitis (HCC)     She is yet to schedule appointment with GI consultants  - in interim continues on Creon 28868 TID, Omeprazole 20mg QD (refilled today)         Relevant Medications    omeprazole (PRILOSEC) 20 MG delayed-release capsule    pancrelipase, Lip-Prot-Amyl, (CREON) 02306-66808 units Cap DR Particles    Migraine without aura and without status migrainosus, not intractable     Chronic migraine, whole head; management with triptan and sometimes excedrine  - refilled Maxalt 10mg QD prn headache  - we can consider preventative therapy if frequency increases >2-3x/month         Relevant Medications    rizatriptan (MAXALT) 10 MG tablet    Condyloma acuminatum     Slightly raised wart lesion x1 <5mm in size noted left anal/buttock region. Uncomfortable when she sits on it, catches when she wipes after using bathroom. We will apply cryotherapy today.    CRYOTHERAPY:  Discussed risks and benefits of cryotherapy. Patient verbally agreed. 3 applications of cryotherapy were applied to wart lesion on left buttock. Patient tolerated procedure well. Aftercare instructions given.              Medications Prescribed Today:  1. Migraine without aura and without status migrainosus, not intractable  - rizatriptan (MAXALT) 10 MG tablet; Take 1 Tablet by mouth one time as needed for Migraine for up to 1 dose.  Dispense: 10 Tablet; Refill: 2    2. Chronic pancreatitis, unspecified pancreatitis type (HCC)  - omeprazole (PRILOSEC) 20 MG delayed-release capsule; Take 1 Capsule by mouth every day.  Dispense: 90 Capsule; Refill: 0  - pancrelipase, Lip-Prot-Amyl, (CREON) 47999-04750 units Cap DR Particles; Take 1 Capsule by mouth 3 times a day with meals.  Dispense: 180 Capsule; Refill: 0    Educated in proper administration of medication(s) ordered today including safety, possible SE, risks, benefits, rationale and alternatives to therapy.     Return in about 4 weeks (around 12/1/2022) for Medicare  Annual Visit.    Please note that this dictation was created using voice recognition software. I have made every reasonable attempt to correct obvious errors, but I expect that there are errors of grammar and possibly content that I did not discover before finalizing the note.

## 2022-11-04 PROBLEM — A63.0 CONDYLOMA ACUMINATUM: Status: ACTIVE | Noted: 2022-11-04

## 2022-11-04 NOTE — ASSESSMENT & PLAN NOTE
She is yet to schedule appointment with GI consultants  - in interim continues on Creon 04151 TID, Omeprazole 20mg QD (refilled today)

## 2022-11-04 NOTE — ASSESSMENT & PLAN NOTE
Slightly raised wart lesion x1 <5mm in size noted left anal/buttock region. Uncomfortable when she sits on it, catches when she wipes after using bathroom. We will apply cryotherapy today.    CRYOTHERAPY:  Discussed risks and benefits of cryotherapy. Patient verbally agreed. 3 applications of cryotherapy were applied to wart lesion on left buttock. Patient tolerated procedure well. Aftercare instructions given.

## 2022-11-17 ENCOUNTER — APPOINTMENT (OUTPATIENT)
Dept: MEDICAL GROUP | Facility: PHYSICIAN GROUP | Age: 67
End: 2022-11-17
Payer: MEDICARE

## 2022-11-28 DIAGNOSIS — J44.9 CHRONIC OBSTRUCTIVE PULMONARY DISEASE, UNSPECIFIED COPD TYPE (HCC): ICD-10-CM

## 2022-11-29 RX ORDER — FLUTICASONE PROPIONATE AND SALMETEROL 250; 50 UG/1; UG/1
POWDER RESPIRATORY (INHALATION)
Qty: 60 EACH | Refills: 1 | Status: SHIPPED | OUTPATIENT
Start: 2022-11-29

## 2022-12-08 ENCOUNTER — TELEPHONE (OUTPATIENT)
Dept: MEDICAL GROUP | Facility: PHYSICIAN GROUP | Age: 67
End: 2022-12-08
Payer: MEDICARE

## 2022-12-08 NOTE — TELEPHONE ENCOUNTER
Future Appointments         Provider Department Center    12/15/2022 9:00 AM (Arrive by 8:45 AM) Ann Marie Coelho D.N.P. [facility] [place] [facility]    1/24/2023 9:10 AM Olivia Contreras M.D. [facility]           ANNUAL WELLNESS VISIT PRE-VISIT PLANNING    1.  Reviewed notes from the last office visit: Yes, LOV 11/03/22    2.  If any orders were ordered or intended to be done prior to visit (i.e. 6 mos follow-up), do we have results/consult notes or has patient scheduled?          Labs - Labs were not ordered at last office visit.  Note: If patient appointment is for lab review and patient did not complete labs, check with provider if OK to reschedule patient until labs completed.         Imaging - Imaging was not ordered at last office visit.         Referrals - No referrals were ordered at last office visit.    3.  Immunizations were updated in Epic using Reconcile Outside Information activity? Yes         Is patient due for Tdap? YES. Patient was not notified of copay/out of pocket cost.         Is patient due for Shingrix? NO    4.  Patient is due for the following Health Maintenance Topics:   Health Maintenance Due   Topic Date Due    Annual Wellness Visit  Never done    IMM HEP B VACCINE (1 of 3 - 3-dose series) Never done    HEPATITIS C SCREENING  Never done    Annual Pulmonary Function Test / Spirometry  Never done    IMM PNEUMOCOCCAL VACCINE: 65+ Years (1 - PCV) Never done    IMM DTaP/Tdap/Td Vaccine (1 - Tdap) Never done    MAMMOGRAM  Never done    BONE DENSITY  12/10/2020    COVID-19 Vaccine (2 - Pfizer series) 11/21/2022       - Patient plans to schedule appointment for Dexa Scan, Immunizations: HEPATITIS B, and Mammogram.  - Patient already has appointment scheduled for Annual Wellness Visit (AWV).    5.  Reviewed/Updated the following with patient:          Preferred Pharmacy? Yes          Preferred Lab? Yes          Preferred Communication? Yes          Allergies? Yes           Medications? YES. Was Abstract Encounter opened and chart updated? YES          Social History? Yes          Family History (document living status of immediate family members and if + hx of  cancer, diabetes, hypertension, hyperlipidemia, heart attack, stroke) Yes    6.  Care Team Updated:          DME Company (gait device, O2, CPAP, etc.): YES, [employer]          Other Specialists (eye doctor, derm, GYN, cardiology, endo, etc): YES    7.  Patient was advised: “This is a free wellness visit. The provider will screen for medical conditions to help you stay healthy. If you have other concerns to address you may be asked to discuss these at a separate visit or there may be an additional fee.”     8.  AHA (Puls8) form printed for Provider? N/A  Pt reminded of her check in time

## 2022-12-15 ENCOUNTER — OFFICE VISIT (OUTPATIENT)
Dept: MEDICAL GROUP | Facility: PHYSICIAN GROUP | Age: 67
End: 2022-12-15
Payer: MEDICARE

## 2022-12-15 VITALS
HEIGHT: 63 IN | TEMPERATURE: 96.7 F | HEART RATE: 80 BPM | SYSTOLIC BLOOD PRESSURE: 116 MMHG | DIASTOLIC BLOOD PRESSURE: 68 MMHG | WEIGHT: 144 LBS | RESPIRATION RATE: 16 BRPM | OXYGEN SATURATION: 96 % | BODY MASS INDEX: 25.52 KG/M2

## 2022-12-15 DIAGNOSIS — G43.009 MIGRAINE WITHOUT AURA AND WITHOUT STATUS MIGRAINOSUS, NOT INTRACTABLE: ICD-10-CM

## 2022-12-15 DIAGNOSIS — F31.9 BIPOLAR AFFECTIVE DISORDER, REMISSION STATUS UNSPECIFIED (HCC): ICD-10-CM

## 2022-12-15 DIAGNOSIS — Z00.00 ENCOUNTER FOR ANNUAL WELLNESS VISIT (AWV) IN MEDICARE PATIENT: ICD-10-CM

## 2022-12-15 DIAGNOSIS — K86.1 CHRONIC PANCREATITIS, UNSPECIFIED PANCREATITIS TYPE (HCC): ICD-10-CM

## 2022-12-15 PROCEDURE — G0439 PPPS, SUBSEQ VISIT: HCPCS | Performed by: NURSE PRACTITIONER

## 2022-12-15 ASSESSMENT — PATIENT HEALTH QUESTIONNAIRE - PHQ9
SUM OF ALL RESPONSES TO PHQ QUESTIONS 1-9: 19
CLINICAL INTERPRETATION OF PHQ2 SCORE: 6
5. POOR APPETITE OR OVEREATING: 3 - NEARLY EVERY DAY

## 2022-12-15 ASSESSMENT — ENCOUNTER SYMPTOMS: GENERAL WELL-BEING: FAIR

## 2022-12-15 ASSESSMENT — ACTIVITIES OF DAILY LIVING (ADL): BATHING_REQUIRES_ASSISTANCE: 0

## 2022-12-15 NOTE — PROGRESS NOTES
Chief Complaint   Patient presents with    Follow-Up     Follow up on stomach issues.,       HPI:  Sussy Platt is a 67 y.o. here with  for Medicare Annual Wellness Visit. She continues to have diarrhea with abdominal discomfort. She met with GI consultants and has pending labs, the will need to schedule follow up visit.      Patient Active Problem List    Diagnosis Date Noted    Condyloma acuminatum 11/04/2022    Chronic obstructive pulmonary disease (HCC) 08/19/2022    Migraine without aura and without status migrainosus, not intractable 08/19/2022    Chronic pancreatitis (HCC) 08/06/2022    Bipolar disorder ([facility]) 08/06/2022       Current Outpatient Medications   Medication Sig Dispense Refill    fluticasone-salmeterol (ADVAIR) 250-50 MCG/ACT AEROSOL POWDER, BREATH ACTIVATED INHALE 1 PUFF BY MOUTH EVERY 12 HOURS 60 Each 1    rizatriptan (MAXALT) 10 MG tablet Take 1 Tablet by mouth one time as needed for Migraine for up to 1 dose. 10 Tablet 2    omeprazole (PRILOSEC) 20 MG delayed-release capsule Take 1 Capsule by mouth every day. 90 Capsule 0    pancrelipase, Lip-Prot-Amyl, (CREON) 59338-11519 units Cap DR Particles Take 1 Capsule by mouth 3 times a day with meals. 180 Capsule 0    albuterol 108 (90 Base) MCG/ACT Aero Soln inhalation aerosol INHALE 2 PUFFS EVERY FOUR HOURS AS NEEDED FOR SHORTNESS OF BREATH. 6.7 Each 2    DULoxetine (CYMBALTA) 60 MG Cap DR Particles delayed-release capsule       quetiapine (SEROQUEL) 300 MG tablet Take 300 mg by mouth 2 times a day.       No current facility-administered medications for this visit.          Current supplements as per medication list.     Allergies: Patient has no known allergies.    Current social contact/activities: no       She  reports that she has been smoking cigarettes. She has never used smokeless tobacco. She reports current alcohol use. She reports that she does not use drugs.  Ready to quit: Not Answered  Counseling given: Not  Answered      ROS:    Gait: Uses no assistive device  Ostomy: No  Other tubes: No  Amputations: No  Chronic oxygen use: no  Last eye exam: 2021  Wears hearing aids: No   : Denies any urinary leakage during the last 6 months    Screening:  Depression Screening  Little interest or pleasure in doing things?  3 - nearly every day  Feeling down, depressed , or hopeless? 3 - nearly every day  Trouble falling or staying asleep, or sleeping too much?  2 - more than half the days  Feeling tired or having little energy?  2 - more than half the days  Poor appetite or overeating?  3 - nearly every day  Feeling bad about yourself - or that you are a failure or have let yourself or your family down? 2 - more than half the days  Trouble concentrating on things, such as reading the newspaper or watching television? 2 - more than half the days  Moving or speaking so slowly that other people could have noticed.  Or the opposite - being so fidgety or restless that you have been moving around a lot more than usual?  2 - more than half the days  Thoughts that you would be better off dead, or of hurting yourself?  0 - not at all  Patient Health Questionnaire Score: 19    If depressive symptoms identified deferred to follow up visit unless specifically addressed in assessment and plan.    Interpretation of PHQ-9 Total Score   Score Severity   1-4 No Depression   5-9 Mild Depression   10-14 Moderate Depression   15-19 Moderately Severe Depression   20-27 Severe Depression    Screening for Cognitive Impairment  Three Minute Recall (daughter, heaven, mountain) 3/3    Wiley clock face with all 12 numbers and set the hands to show 10 past 11.  Yes    Cognitive concerns identified deferred for follow up unless specifically addressed in assessment and plan.    Fall Risk Assessment  Has the patient had two or more falls in the last year or any fall with injury in the last year?  No    Safety Assessment  Throw rugs on floor.  No  Handrails on all  stairs.  Yes  Good lighting in all hallways.  Yes  Difficulty hearing.  Yes  Patient counseled about all safety risks that were identified.    Functional Assessment ADLs  Are there any barriers preventing you from cooking for yourself or meeting nutritional needs?  No.    Are there any barriers preventing you from driving safely or obtaining transportation?  No.    Are there any barriers preventing you from using a telephone or calling for help?  No    Are there any barriers preventing you from shopping?  No.    Are there any barriers preventing you from taking care of your own finances?  No    Are there any barriers preventing you from managing your medications?  No    Are there any barriers preventing you from showering, bathing or dressing yourself? No    Are you currently engaging in any exercise or physical activity?  No.    What is your perception of your health? Fair    Advance Care Planning  Do you have an Advance Directive, Living Will, Durable Power of , or POLST? Yes    Living Will            Health Maintenance Summary            Ordered - Annual Pulmonary Function Test / Spirometry (Yearly) Ordered on 8/18/2022      No completion history exists for this topic.              Ordered - MAMMOGRAM (Every 2 Years) Ordered on 8/19/2022      No completion history exists for this topic.              Postponed - IMM ZOSTER VACCINES (1 of 2) Postponed until 1/19/2023      No completion history exists for this topic.              Postponed - BONE DENSITY (Every 5 Years) Postponed until 12/15/2023      12/10/2015  Done - BMD -2.4              Postponed - IMM HEP B VACCINE (1 of 3 - 3-dose series) Postponed until 12/15/2023      No completion history exists for this topic.              Postponed - IMM DTaP/Tdap/Td Vaccine (1 - Tdap) Postponed until 12/15/2023      No completion history exists for this topic.              Postponed - COVID-19 Vaccine (2 - Pfizer series) Postponed until 12/15/2023       10/31/2022  Imm Admin: PFIZER BIVALENT BOOSTER SARS-COV-2 VACCINE (12+)              Postponed - HEPATITIS C SCREENING (Once) Postponed until 12/15/2023      No completion history exists for this topic.              Postponed - IMM PNEUMOCOCCAL VACCINE: 65+ Years (1 - PCV) Postponed until 12/15/2023      No completion history exists for this topic.              Annual Wellness Visit (Every 366 Days) Next due on 12/16/2023      12/15/2022  Level of Service: ANNUAL WELLNESS VISIT-INCLUDES PPPS SUBSEQUE*              COLORECTAL CANCER SCREENING (COLONOSCOPY - Every 10 Years) Next due on 1/15/2025      01/15/2015  COLONOSCOPY (Done - multiple polyps)    02/15/2012  Referral to GI for Colonoscopy    12/02/2009  COLONOSCOPY (Reason not specified - Adenoma Polyps; recall in 2 years)    12/02/2009  Referral to GI for Colonoscopy              IMM INFLUENZA (Series Information) Completed      09/26/2022  Imm Admin: Influenza Vaccine Adult HD              IMM MENINGOCOCCAL ACWY VACCINE (Series Information) Aged Out      No completion history exists for this topic.              Discontinued - ABDOMINAL AORTIC ANEURYSM (AAA) SCREEN  Discontinued      12/10/2015  Done - aorta size <3cm                    Patient Care Team:  Ann Marie Coelho D.N.P. as PCP - General (Nurse Practitioner Family)  LAILA Solo as Consulting Physician (Nurse Practitioner)      Social History     Tobacco Use    Smoking status: Some Days     Types: Cigarettes    Smokeless tobacco: Never   Vaping Use    Vaping Use: Never used   Substance Use Topics    Alcohol use: Yes     Comment: twice a month liquor    Drug use: Never     Family History   Problem Relation Age of Onset    Heart Disease Mother         congesed heart failure due to cancer complications    Cancer Mother 40        Breast cancer    No Known Problems Sister     No Known Problems Sister     Cancer Brother     Heart Disease Maternal Grandmother         heart attack    No Known  "Problems Daughter     No Known Problems Daughter      She  has no past medical history on file.   History reviewed. No pertinent surgical history.    Exam:   /68   Pulse 80   Temp 35.9 °C (96.7 °F)   Resp 16   Ht 1.6 m (5' 3\")   Wt 65.3 kg (144 lb)   SpO2 96%  Body mass index is 25.51 kg/m².    Hearing excellent.    Dentition good  Alert, oriented in no acute distress.  Eye contact is good, speech goal directed, affect calm    Assessment and Plan. The following treatment and monitoring plan is recommended:    1. Bipolar affective disorder, remission status unspecified ([facility])  Seeing Psychiatrist in [place] for the past 4 years; On daily cymbalta and nightly seroquel    2. Chronic pancreatitis, unspecified pancreatitis type ([facility])  Seeing GI Consultants; pending labs  - continues on Creon 12933 TID, Omeprazole 20mg QD (refilled today)    3. Migraine without aura and without status migrainosus, not intractable  Chronic migraine, whole head; management with triptan and sometimes excedrine  - continue Maxalt 10mg QD prn headache  - we can consider preventative therapy if frequency increases >2-3x/month    4. Encounter for annual wellness visit (AWV) in Medicare patient  Services suggested: No services needed at this time  Health Care Screening: Age-appropriate preventive services recommended by USPTF and ACIP covered by Medicare were discussed today. Services ordered if indicated and agreed upon by the patient.  Referrals offered: Community-based lifestyle interventions to reduce health risks and promote self-management and wellness, fall prevention, nutrition, physical activity, tobacco-use cessation, weight loss, and mental health services as per orders if indicated.    Discussion today about general wellness and lifestyle habits:    Prevent falls and reduce trip hazards; Cautioned about securing or removing rugs.  Have a working fire alarm and carbon monoxide detector;   Engage in regular physical activity " and social activities     Follow-up: Return in about 3 months (around 3/15/2023) for migraine mgt.

## 2022-12-20 ENCOUNTER — HOSPITAL ENCOUNTER (OUTPATIENT)
Dept: LAB | Facility: MEDICAL CENTER | Age: 67
End: 2022-12-20
Payer: MEDICARE

## 2023-01-18 ENCOUNTER — HOSPITAL ENCOUNTER (OUTPATIENT)
Facility: MEDICAL CENTER | Age: 68
End: 2023-01-18
Payer: MEDICARE

## 2023-01-18 PROCEDURE — 82653 EL-1 FECAL QUANTITATIVE: CPT

## 2023-01-21 LAB — ELASTASE PANC STL-MCNT: 627 UG/G

## 2023-01-24 ENCOUNTER — OFFICE VISIT (OUTPATIENT)
Dept: SLEEP MEDICINE | Facility: MEDICAL CENTER | Age: 68
End: 2023-01-24
Payer: MEDICARE

## 2023-01-24 VITALS
DIASTOLIC BLOOD PRESSURE: 80 MMHG | WEIGHT: 146 LBS | RESPIRATION RATE: 16 BRPM | HEIGHT: 63 IN | HEART RATE: 94 BPM | SYSTOLIC BLOOD PRESSURE: 112 MMHG | OXYGEN SATURATION: 93 % | BODY MASS INDEX: 25.87 KG/M2

## 2023-01-24 DIAGNOSIS — Z72.0 TOBACCO USE: ICD-10-CM

## 2023-01-24 DIAGNOSIS — J44.9 CHRONIC OBSTRUCTIVE PULMONARY DISEASE, UNSPECIFIED COPD TYPE (HCC): ICD-10-CM

## 2023-01-24 PROCEDURE — 99204 OFFICE O/P NEW MOD 45 MIN: CPT | Performed by: INTERNAL MEDICINE

## 2023-01-24 ASSESSMENT — ENCOUNTER SYMPTOMS
PALPITATIONS: 0
DIZZINESS: 0
FEVER: 0
CONSTIPATION: 0
WHEEZING: 0
SINUS PAIN: 0
CHILLS: 0
SORE THROAT: 0
SPUTUM PRODUCTION: 0
CLAUDICATION: 0
FOCAL WEAKNESS: 0
DIARRHEA: 0
DOUBLE VISION: 0
TREMORS: 0
WEIGHT LOSS: 0
WEAKNESS: 0
DIAPHORESIS: 0
ORTHOPNEA: 0
HEADACHES: 0
SPEECH CHANGE: 0
EYE REDNESS: 0
ABDOMINAL PAIN: 0
MYALGIAS: 0
EYE DISCHARGE: 0
COUGH: 0
STRIDOR: 0
PHOTOPHOBIA: 0
BACK PAIN: 0
NAUSEA: 0
PND: 0
EYE PAIN: 0
FALLS: 0
DEPRESSION: 0
BLURRED VISION: 0
VOMITING: 0
NECK PAIN: 0
HEARTBURN: 0
HEMOPTYSIS: 0
SHORTNESS OF BREATH: 1

## 2023-01-24 NOTE — PROGRESS NOTES
Chief Complaint   Patient presents with    [facility]     Referral 22 Ann Marie Coelho, KRISS DX COPD        HPI: This patient is a 67 y.o. female presenting for evaluation of chart diagnosis of COPD.  Past medical history is significant for bipolar disorder, pancreatitis, chart diagnosis of COPD.  No surgical history.  She is currently unemployed and denies any occupational or environmental exposures.  She lives with her  and 2 dogs, denies any allergy symptoms.  Mother was diagnosed with breast cancer in her 40s but  from heart disease later in life.  Patient reports being hospitalized for pneumonia at [facility] in  but denies being treated for acute bronchitis with steroids and antibiotics on a regular basis.  She is on Advair for presumed COPD and short acting bronchodilators.  She uses albuterol 1-2 times per day.  She denies chronic cough but is short of breath even at rest and has been so for many years.  Denies any progression of symptoms.  She smoked a pack per day for 42 years and quit last year but continues to take a hit or cheat every once in a while per her  who is also present with the patient today.  No imaging.    History reviewed. No pertinent past medical history.    Social History     Socioeconomic History    Marital status:      Spouse name: Not on file    Number of children: Not on file    Years of education: Not on file    Highest education level: Not on file   Occupational History    Not on file   Tobacco Use    Smoking status: Some Days     Types: Cigarettes     Passive exposure: Past    Smokeless tobacco: Never   Vaping Use    Vaping Use: Never used   Substance and Sexual Activity    Alcohol use: Yes     Comment: twice a month liquor    Drug use: Not Currently     Types: Methamphetamines    Sexual activity: Not Currently   Other Topics Concern    Not on file   Social History Narrative    Not on file     Social Determinants of Health     Financial Resource  Strain: Not on file   Food Insecurity: Not on file   Transportation Needs: Not on file   Physical Activity: Not on file   Stress: Not on file   Social Connections: Not on file   Intimate Partner Violence: Not on file   Housing Stability: Not on file       Family History   Problem Relation Age of Onset    Heart Disease Mother         congesed heart failure due to cancer complications    Cancer Mother 40        Breast cancer    No Known Problems Sister     No Known Problems Sister     Cancer Brother     Heart Disease Maternal Grandmother         heart attack    No Known Problems Daughter     No Known Problems Daughter        Current Outpatient Medications on File Prior to Visit   Medication Sig Dispense Refill    rizatriptan (MAXALT) 10 MG tablet Take 1 Tablet by mouth one time as needed for Migraine for up to 1 dose. 10 Tablet 2    omeprazole (PRILOSEC) 20 MG delayed-release capsule Take 1 Capsule by mouth every day. 90 Capsule 0    pancrelipase, Lip-Prot-Amyl, (CREON) 31620-56485 units Cap DR Particles Take 1 Capsule by mouth 3 times a day with meals. 180 Capsule 0    albuterol 108 (90 Base) MCG/ACT Aero Soln inhalation aerosol INHALE 2 PUFFS EVERY FOUR HOURS AS NEEDED FOR SHORTNESS OF BREATH. 6.7 Each 2    DULoxetine (CYMBALTA) 60 MG Cap DR Particles delayed-release capsule       quetiapine (SEROQUEL) 300 MG tablet Take 300 mg by mouth 2 times a day.      fluticasone-salmeterol (ADVAIR) 250-50 MCG/ACT AEROSOL POWDER, BREATH ACTIVATED INHALE 1 PUFF BY MOUTH EVERY 12 HOURS 60 Each 1     No current facility-administered medications on file prior to visit.       Allergies: Patient has no known allergies.    ROS:   Review of Systems   Constitutional:  Negative for chills, diaphoresis, fever, malaise/fatigue and weight loss.   HENT:  Negative for congestion, ear discharge, ear pain, hearing loss, nosebleeds, sinus pain, sore throat and tinnitus.    Eyes:  Negative for blurred vision, double vision, photophobia, pain,  "discharge and redness.   Respiratory:  Positive for shortness of breath. Negative for cough, hemoptysis, sputum production, wheezing and stridor.    Cardiovascular:  Negative for chest pain, palpitations, orthopnea, claudication, leg swelling and PND.   Gastrointestinal:  Negative for abdominal pain, constipation, diarrhea, heartburn, nausea and vomiting.   Genitourinary:  Negative for dysuria and urgency.   Musculoskeletal:  Negative for back pain, falls, joint pain, myalgias and neck pain.   Skin:  Negative for itching and rash.   Neurological:  Negative for dizziness, tremors, speech change, focal weakness, weakness and headaches.   Endo/Heme/Allergies:  Negative for environmental allergies.   Psychiatric/Behavioral:  Negative for depression.      /80 (BP Location: Right arm, Patient Position: Sitting, BP Cuff Size: Adult)   Pulse 94   Resp 16   Ht 1.6 m (5' 3\")   Wt 66.2 kg (146 lb)   SpO2 93%     Physical Exam:  Physical Exam  Constitutional:       General: She is not in acute distress.     Appearance: Normal appearance. She is well-developed and normal weight.   HENT:      Head: Normocephalic and atraumatic.      Right Ear: External ear normal.      Left Ear: External ear normal.      Nose: Nose normal. No congestion.      Mouth/Throat:      Mouth: Mucous membranes are moist.      Pharynx: Oropharynx is clear. No oropharyngeal exudate.   Eyes:      General: No scleral icterus.     Extraocular Movements: Extraocular movements intact.      Conjunctiva/sclera: Conjunctivae normal.      Pupils: Pupils are equal, round, and reactive to light.   Neck:      Vascular: No JVD.      Trachea: No tracheal deviation.   Cardiovascular:      Rate and Rhythm: Normal rate and regular rhythm.      Heart sounds: Normal heart sounds. No murmur heard.    No friction rub. No gallop.   Pulmonary:      Effort: Pulmonary effort is normal. No accessory muscle usage or respiratory distress.      Breath sounds: Normal breath " sounds. No wheezing or rales.   Abdominal:      General: There is no distension.      Palpations: Abdomen is soft.      Tenderness: There is no abdominal tenderness.   Musculoskeletal:         General: No tenderness or deformity. Normal range of motion.      Cervical back: Normal range of motion and neck supple.      Right lower leg: No edema.      Left lower leg: No edema.   Lymphadenopathy:      Cervical: No cervical adenopathy.   Skin:     General: Skin is warm and dry.      Findings: No rash.      Nails: There is no clubbing.   Neurological:      Mental Status: She is alert and oriented to person, place, and time.      Cranial Nerves: No cranial nerve deficit.      Gait: Gait normal.   Psychiatric:         Behavior: Behavior normal.       PFTs as reviewed by me personally: None    Imaging as reviewed by me personally: None    Assessment:  1. Chronic obstructive pulmonary disease, unspecified COPD type (HCC)  Referral to Other    CANCELED: Referral to Other      2. Tobacco use  Referral to Other    CANCELED: Referral to Other          Plan:  This is presumed based on tobacco history and symptoms of shortness of breath.  I have no imaging or pulmonary function testing.  Symptoms are overall stable although she is short of breath all the time.  I recommended baseline pulmonary function testing and pending that we could consider pulmonary rehab.  Patient would like to establish care at [facility] therefore we opted out of ordering any testing here today.  She was counseled on complete avoidance of all tobacco products.  For the most part patient is not a regular tobacco user but continues to smoke a cigarette every now and then which she was counseled against.  She does qualify for lung cancer screening and I recommended this to the patient who would like to establish care at [facility].  Referral was placed to [facility] pulmonary.  Return if symptoms worsen or fail to improve.

## 2023-01-31 DIAGNOSIS — J44.9 CHRONIC OBSTRUCTIVE PULMONARY DISEASE, UNSPECIFIED COPD TYPE (HCC): ICD-10-CM

## 2023-01-31 RX ORDER — ALBUTEROL SULFATE 90 UG/1
2 AEROSOL, METERED RESPIRATORY (INHALATION) EVERY 4 HOURS PRN
Qty: 6.7 EACH | Refills: 0 | Status: SHIPPED | OUTPATIENT
Start: 2023-01-31 | End: 2023-03-01

## 2023-02-03 DIAGNOSIS — K86.1 CHRONIC PANCREATITIS, UNSPECIFIED PANCREATITIS TYPE (HCC): ICD-10-CM

## 2023-02-03 RX ORDER — OMEPRAZOLE 20 MG/1
20 CAPSULE, DELAYED RELEASE ORAL DAILY
Qty: 90 CAPSULE | Refills: 3 | Status: SHIPPED | OUTPATIENT
Start: 2023-02-03 | End: 2024-01-30

## 2023-03-01 DIAGNOSIS — J44.9 CHRONIC OBSTRUCTIVE PULMONARY DISEASE, UNSPECIFIED COPD TYPE (HCC): ICD-10-CM

## 2023-03-01 RX ORDER — ALBUTEROL SULFATE 90 UG/1
AEROSOL, METERED RESPIRATORY (INHALATION)
Qty: 6.7 EACH | Refills: 0 | Status: SHIPPED | OUTPATIENT
Start: 2023-03-01 | End: 2023-08-01

## 2023-03-06 DIAGNOSIS — G43.009 MIGRAINE WITHOUT AURA AND WITHOUT STATUS MIGRAINOSUS, NOT INTRACTABLE: ICD-10-CM

## 2023-03-06 DIAGNOSIS — J44.9 CHRONIC OBSTRUCTIVE PULMONARY DISEASE, UNSPECIFIED COPD TYPE (HCC): ICD-10-CM

## 2023-03-07 RX ORDER — ALBUTEROL SULFATE 90 UG/1
AEROSOL, METERED RESPIRATORY (INHALATION)
Qty: 6.7 EACH | Refills: 2 | OUTPATIENT
Start: 2023-03-07

## 2023-03-08 RX ORDER — RIZATRIPTAN BENZOATE 10 MG/1
10 TABLET ORAL
Qty: 10 TABLET | Refills: 1 | Status: SHIPPED | OUTPATIENT
Start: 2023-03-08 | End: 2023-03-15 | Stop reason: SDUPTHER

## 2023-03-13 ENCOUNTER — TELEPHONE (OUTPATIENT)
Dept: MEDICAL GROUP | Facility: PHYSICIAN GROUP | Age: 68
End: 2023-03-13
Payer: MEDICARE

## 2023-03-13 NOTE — TELEPHONE ENCOUNTER
Ann Marie,  I received a fax from [employer] stating the the Rizatriptan is on patient formulary but is subject to a quantity limit. The limit is 12 per 30 days. I scanned the document into patient's  for reference

## 2023-03-15 ENCOUNTER — OFFICE VISIT (OUTPATIENT)
Dept: MEDICAL GROUP | Facility: PHYSICIAN GROUP | Age: 68
End: 2023-03-15
Payer: MEDICARE

## 2023-03-15 VITALS
WEIGHT: 155 LBS | TEMPERATURE: 97.6 F | DIASTOLIC BLOOD PRESSURE: 70 MMHG | SYSTOLIC BLOOD PRESSURE: 110 MMHG | HEART RATE: 76 BPM | BODY MASS INDEX: 27.46 KG/M2 | OXYGEN SATURATION: 98 % | HEIGHT: 63 IN

## 2023-03-15 DIAGNOSIS — G43.009 MIGRAINE WITHOUT AURA AND WITHOUT STATUS MIGRAINOSUS, NOT INTRACTABLE: ICD-10-CM

## 2023-03-15 DIAGNOSIS — F31.9 BIPOLAR AFFECTIVE DISORDER, REMISSION STATUS UNSPECIFIED (HCC): ICD-10-CM

## 2023-03-15 DIAGNOSIS — K86.1 CHRONIC PANCREATITIS, UNSPECIFIED PANCREATITIS TYPE (HCC): ICD-10-CM

## 2023-03-15 PROCEDURE — 99213 OFFICE O/P EST LOW 20 MIN: CPT | Performed by: NURSE PRACTITIONER

## 2023-03-15 RX ORDER — RIZATRIPTAN BENZOATE 10 MG/1
10 TABLET ORAL
Qty: 12 TABLET | Refills: 2 | Status: SHIPPED | OUTPATIENT
Start: 2023-03-15

## 2023-03-15 ASSESSMENT — ENCOUNTER SYMPTOMS
NEUROLOGICAL NEGATIVE: 1
EYES NEGATIVE: 1
SHORTNESS OF BREATH: 0
PALPITATIONS: 0
PSYCHIATRIC NEGATIVE: 1
SPUTUM PRODUCTION: 0
FEVER: 0
COUGH: 0
MUSCULOSKELETAL NEGATIVE: 1
CONSTITUTIONAL NEGATIVE: 1

## 2023-03-15 NOTE — PROGRESS NOTES
Subjective       CC:   Chief Complaint   Patient presents with    GI Problem     Pressure in stomach, stool every time going to bathroom,         HPI:   Patient is a 67 y.o. established female patient with medical history listed below here today for evaluation and management of chronic migraines.   Active concern today is feeling bloated and having frequent stools. She is established with [facility] and is over due for follow up. She will schedule her next visit with them. She also has lab orders from GI.     Problem   Migraine Without Aura and Without Status Migrainosus, Not Intractable    Reports Mirgraines for several years; whole head hurts with onset; no aura before; sometimes it affects eye sight; had 1 migraine last week & yesterday; took sisters Maxalt with advil & head ache ceased.   Using prn Maxalt 2-3x/month; occasional Excedrin for milder headaches      Bipolar Disorder (Hcc)    States seeing Psychiatrist in [place] for the past 4 years; On daily cymbalta 60mg and nightly seroquel 300mg BID. States was told she is bipolar. There is mention of meth/alcohol use in the past. Unclear if sobriety is maintained.  - get records   - she would like to continue treatment with current psychiatrist         Patient Active Problem List   Diagnosis    Chronic pancreatitis (HCC)    Bipolar disorder (HCC)    Chronic obstructive pulmonary disease (HCC)    Migraine without aura and without status migrainosus, not intractable    Condyloma acuminatum       History reviewed. No pertinent past medical history.     History reviewed. No pertinent surgical history.     Current Outpatient Medications on File Prior to Visit   Medication Sig Dispense Refill    albuterol 108 (90 Base) MCG/ACT Aero Soln inhalation aerosol INHALE 2 PUFFS EVERY 4 HOURS AS NEEDED FOR SHORTNESS OF BREATH 6.7 Each 0    omeprazole (PRILOSEC) 20 MG delayed-release capsule TAKE 1 CAPSULE BY MOUTH EVERY DAY 90 Capsule 3    fluticasone-salmeterol (ADVAIR)  "250-50 MCG/ACT AEROSOL POWDER, BREATH ACTIVATED INHALE 1 PUFF BY MOUTH EVERY 12 HOURS 60 Each 1    pancrelipase, Lip-Prot-Amyl, (CREON) 70481-11295 units Cap DR Particles Take 1 Capsule by mouth 3 times a day with meals. 180 Capsule 0    DULoxetine (CYMBALTA) 60 MG Cap DR Particles delayed-release capsule       quetiapine (SEROQUEL) 300 MG tablet Take 300 mg by mouth 2 times a day.       No current facility-administered medications on file prior to visit.        Health Maintenance: Completed  - reminded her to get mammogram done    ROS:  Review of Systems   Constitutional: Negative.  Negative for fever and malaise/fatigue.   HENT: Negative.     Eyes: Negative.    Respiratory:  Negative for cough, sputum production and shortness of breath.    Cardiovascular:  Negative for chest pain, palpitations and leg swelling.   Gastrointestinal:         Persisting abdominal bloating   Genitourinary: Negative.    Musculoskeletal: Negative.    Neurological: Negative.    Endo/Heme/Allergies: Negative.    Psychiatric/Behavioral: Negative.       Objective       Exam:  /70   Pulse 76   Temp 36.4 °C (97.6 °F)   Ht 1.6 m (5' 3\")   Wt 70.3 kg (155 lb)   SpO2 98%   BMI 27.46 kg/m²  Body mass index is 27.46 kg/m².    Physical Exam  Constitutional:       Appearance: Normal appearance.   Cardiovascular:      Rate and Rhythm: Normal rate and regular rhythm.      Pulses: Normal pulses.      Heart sounds: Normal heart sounds.   Pulmonary:      Effort: Pulmonary effort is normal.      Breath sounds: Normal breath sounds.   Abdominal:      General: Bowel sounds are normal. There is distension.      Palpations: Abdomen is soft.   Musculoskeletal:      Right lower leg: No edema.      Left lower leg: No edema.   Skin:     General: Skin is warm and dry.   Neurological:      General: No focal deficit present.      Mental Status: She is alert and oriented to person, place, and time.      Cranial Nerves: No cranial nerve deficit.      " Sensory: No sensory deficit.       Assessment & Plan       67 y.o. female with the following -     Problem List Items Addressed This Visit       Chronic pancreatitis (HCC)    Bipolar disorder (HCC)    Migraine without aura and without status migrainosus, not intractable     Chronic migraine, whole head; management with triptan and sometimes excedrine  - refilled Maxalt 10mg QD prn headache  - we can consider preventative therapy if frequency increases >2-3x/month         Relevant Medications    rizatriptan (MAXALT) 10 MG tablet     HCC Gap Form    Diagnosis to address: K86.1 - Chronic pancreatitis, unspecified pancreatitis type (HCC)  Assessment and plan: Chronic, stable, as based on today's assessment and impact on other conditions evaluated today. Continue with current treatment plan: continues on Creon 93096 TID, Omeprazole 20mg QD. Follow-up with GI specialist as directed, but at least annually.  Diagnosis: F31.9 - Bipolar affective disorder, remission status unspecified (HCC)  Assessment and plan: Chronic, stable, as based on today's assessment and impact on other conditions evaluated today. Continue with current treatment plan: seeing Psychiatrist in [place] for the past 4 years; On daily cymbalta 60mg and nightly seroquel 300mg BID. Follow-up with specialist as directed, but at least annually.  Last edited 03/15/23 09:56 PDT by DANNY [email or web address].           Medications Prescribed Today:  1. Migraine without aura and without status migrainosus, not intractable  - rizatriptan (MAXALT) 10 MG tablet; Take 1 Tablet by mouth one time as needed for Migraine.  Dispense: 12 Tablet; Refill: 2      Educated in proper administration of medication(s) ordered today including safety, possible SE, risks, benefits, rationale and alternatives to therapy.     Return in about 3 months (around 6/15/2023).    Please note that this dictation was created using voice recognition software. I have made every reasonable attempt to  correct obvious errors, but I expect that there are errors of grammar and possibly content that I did not discover before finalizing the note.

## 2023-04-04 DIAGNOSIS — K86.1 CHRONIC PANCREATITIS, UNSPECIFIED PANCREATITIS TYPE (HCC): ICD-10-CM

## 2023-04-04 RX ORDER — PANCRELIPASE 24000; 76000; 120000 [USP'U]/1; [USP'U]/1; [USP'U]/1
1 CAPSULE, DELAYED RELEASE PELLETS ORAL
Qty: 180 CAPSULE | Refills: 0 | Status: SHIPPED | OUTPATIENT
Start: 2023-04-04 | End: 2023-06-06

## 2023-06-20 ENCOUNTER — TELEPHONE (OUTPATIENT)
Dept: HEALTH INFORMATION MANAGEMENT | Facility: OTHER | Age: 68
End: 2023-06-20

## 2023-06-26 ENCOUNTER — HOSPITAL ENCOUNTER (OUTPATIENT)
Dept: LAB | Facility: MEDICAL CENTER | Age: 68
End: 2023-06-26
Attending: STUDENT IN AN ORGANIZED HEALTH CARE EDUCATION/TRAINING PROGRAM
Payer: MEDICARE

## 2023-06-26 LAB
ALBUMIN SERPL BCP-MCNC: 4 G/DL (ref 3.2–4.9)
ALBUMIN/GLOB SERPL: 1.5 G/DL
ALP SERPL-CCNC: 84 U/L (ref 30–99)
ALT SERPL-CCNC: 12 U/L (ref 2–50)
ANION GAP SERPL CALC-SCNC: 11 MMOL/L (ref 7–16)
AST SERPL-CCNC: 8 U/L (ref 12–45)
BASOPHILS # BLD AUTO: 0.7 % (ref 0–1.8)
BASOPHILS # BLD: 0.05 K/UL (ref 0–0.12)
BILIRUB SERPL-MCNC: 0.4 MG/DL (ref 0.1–1.5)
BUN SERPL-MCNC: 12 MG/DL (ref 8–22)
CALCIUM ALBUM COR SERPL-MCNC: 9.3 MG/DL (ref 8.5–10.5)
CALCIUM SERPL-MCNC: 9.3 MG/DL (ref 8.5–10.5)
CHLORIDE SERPL-SCNC: 105 MMOL/L (ref 96–112)
CHOLEST SERPL-MCNC: 269 MG/DL (ref 100–199)
CO2 SERPL-SCNC: 26 MMOL/L (ref 20–33)
CREAT SERPL-MCNC: 0.75 MG/DL (ref 0.5–1.4)
EOSINOPHIL # BLD AUTO: 0.21 K/UL (ref 0–0.51)
EOSINOPHIL NFR BLD: 2.9 % (ref 0–6.9)
ERYTHROCYTE [DISTWIDTH] IN BLOOD BY AUTOMATED COUNT: 57.7 FL (ref 35.9–50)
FASTING STATUS PATIENT QL REPORTED: NORMAL
GFR SERPLBLD CREATININE-BSD FMLA CKD-EPI: 87 ML/MIN/1.73 M 2
GLOBULIN SER CALC-MCNC: 2.6 G/DL (ref 1.9–3.5)
GLUCOSE SERPL-MCNC: 100 MG/DL (ref 65–99)
HCT VFR BLD AUTO: 46.4 % (ref 37–47)
HDLC SERPL-MCNC: 66 MG/DL
HGB BLD-MCNC: 14.4 G/DL (ref 12–16)
IMM GRANULOCYTES # BLD AUTO: 0.08 K/UL (ref 0–0.11)
IMM GRANULOCYTES NFR BLD AUTO: 1.1 % (ref 0–0.9)
LDLC SERPL CALC-MCNC: 160 MG/DL
LYMPHOCYTES # BLD AUTO: 1.93 K/UL (ref 1–4.8)
LYMPHOCYTES NFR BLD: 27 % (ref 22–41)
MCH RBC QN AUTO: 28.5 PG (ref 27–33)
MCHC RBC AUTO-ENTMCNC: 31 G/DL (ref 32.2–35.5)
MCV RBC AUTO: 91.7 FL (ref 81.4–97.8)
MONOCYTES # BLD AUTO: 0.45 K/UL (ref 0–0.85)
MONOCYTES NFR BLD AUTO: 6.3 % (ref 0–13.4)
NEUTROPHILS # BLD AUTO: 4.42 K/UL (ref 1.82–7.42)
NEUTROPHILS NFR BLD: 62 % (ref 44–72)
NRBC # BLD AUTO: 0 K/UL
NRBC BLD-RTO: 0 /100 WBC (ref 0–0.2)
PLATELET # BLD AUTO: 250 K/UL (ref 164–446)
PMV BLD AUTO: 10 FL (ref 9–12.9)
POTASSIUM SERPL-SCNC: 4.6 MMOL/L (ref 3.6–5.5)
PROT SERPL-MCNC: 6.6 G/DL (ref 6–8.2)
RBC # BLD AUTO: 5.06 M/UL (ref 4.2–5.4)
SODIUM SERPL-SCNC: 142 MMOL/L (ref 135–145)
TRIGL SERPL-MCNC: 216 MG/DL (ref 0–149)
WBC # BLD AUTO: 7.1 K/UL (ref 4.8–10.8)

## 2023-06-26 PROCEDURE — 36415 COLL VENOUS BLD VENIPUNCTURE: CPT

## 2023-06-26 PROCEDURE — 80061 LIPID PANEL: CPT

## 2023-06-26 PROCEDURE — 85025 COMPLETE CBC W/AUTO DIFF WBC: CPT

## 2023-06-26 PROCEDURE — 80053 COMPREHEN METABOLIC PANEL: CPT

## 2023-07-31 DIAGNOSIS — J44.9 CHRONIC OBSTRUCTIVE PULMONARY DISEASE, UNSPECIFIED COPD TYPE (HCC): ICD-10-CM

## 2023-08-01 RX ORDER — ALBUTEROL SULFATE 90 UG/1
2 AEROSOL, METERED RESPIRATORY (INHALATION) EVERY 4 HOURS PRN
Qty: 8.5 EACH | Refills: 1 | Status: SHIPPED | OUTPATIENT
Start: 2023-08-01 | End: 2024-01-30

## 2024-04-18 ENCOUNTER — HOSPITAL ENCOUNTER (OUTPATIENT)
Dept: LAB | Facility: MEDICAL CENTER | Age: 69
End: 2024-04-18
Attending: STUDENT IN AN ORGANIZED HEALTH CARE EDUCATION/TRAINING PROGRAM
Payer: MEDICARE

## 2024-04-18 LAB
ALBUMIN SERPL BCP-MCNC: 4.1 G/DL (ref 3.2–4.9)
ALBUMIN/GLOB SERPL: 1.5 G/DL
ALP SERPL-CCNC: 96 U/L (ref 30–99)
ALT SERPL-CCNC: 16 U/L (ref 2–50)
ANION GAP SERPL CALC-SCNC: 9 MMOL/L (ref 7–16)
AST SERPL-CCNC: 18 U/L (ref 12–45)
BASOPHILS # BLD AUTO: 0.4 % (ref 0–1.8)
BASOPHILS # BLD: 0.03 K/UL (ref 0–0.12)
BILIRUB SERPL-MCNC: 0.5 MG/DL (ref 0.1–1.5)
BUN SERPL-MCNC: 12 MG/DL (ref 8–22)
CALCIUM ALBUM COR SERPL-MCNC: 9.1 MG/DL (ref 8.5–10.5)
CALCIUM SERPL-MCNC: 9.2 MG/DL (ref 8.5–10.5)
CHLORIDE SERPL-SCNC: 103 MMOL/L (ref 96–112)
CHOLEST SERPL-MCNC: 174 MG/DL (ref 100–199)
CO2 SERPL-SCNC: 27 MMOL/L (ref 20–33)
CREAT SERPL-MCNC: 0.7 MG/DL (ref 0.5–1.4)
EOSINOPHIL # BLD AUTO: 0.17 K/UL (ref 0–0.51)
EOSINOPHIL NFR BLD: 2.1 % (ref 0–6.9)
ERYTHROCYTE [DISTWIDTH] IN BLOOD BY AUTOMATED COUNT: 49.9 FL (ref 35.9–50)
EST. AVERAGE GLUCOSE BLD GHB EST-MCNC: 117 MG/DL
FASTING STATUS PATIENT QL REPORTED: NORMAL
GFR SERPLBLD CREATININE-BSD FMLA CKD-EPI: 94 ML/MIN/1.73 M 2
GLOBULIN SER CALC-MCNC: 2.8 G/DL (ref 1.9–3.5)
GLUCOSE SERPL-MCNC: 113 MG/DL (ref 65–99)
HBA1C MFR BLD: 5.7 % (ref 4–5.6)
HCT VFR BLD AUTO: 47.2 % (ref 37–47)
HDLC SERPL-MCNC: 75 MG/DL
HGB BLD-MCNC: 15.7 G/DL (ref 12–16)
IMM GRANULOCYTES # BLD AUTO: 0.05 K/UL (ref 0–0.11)
IMM GRANULOCYTES NFR BLD AUTO: 0.6 % (ref 0–0.9)
LDLC SERPL CALC-MCNC: 81 MG/DL
LYMPHOCYTES # BLD AUTO: 2 K/UL (ref 1–4.8)
LYMPHOCYTES NFR BLD: 24.3 % (ref 22–41)
MCH RBC QN AUTO: 29.3 PG (ref 27–33)
MCHC RBC AUTO-ENTMCNC: 33.3 G/DL (ref 32.2–35.5)
MCV RBC AUTO: 88.2 FL (ref 81.4–97.8)
MONOCYTES # BLD AUTO: 0.53 K/UL (ref 0–0.85)
MONOCYTES NFR BLD AUTO: 6.4 % (ref 0–13.4)
NEUTROPHILS # BLD AUTO: 5.45 K/UL (ref 1.82–7.42)
NEUTROPHILS NFR BLD: 66.2 % (ref 44–72)
NRBC # BLD AUTO: 0 K/UL
NRBC BLD-RTO: 0 /100 WBC (ref 0–0.2)
PLATELET # BLD AUTO: 295 K/UL (ref 164–446)
PMV BLD AUTO: 10.2 FL (ref 9–12.9)
POTASSIUM SERPL-SCNC: 4.5 MMOL/L (ref 3.6–5.5)
PROT SERPL-MCNC: 6.9 G/DL (ref 6–8.2)
RBC # BLD AUTO: 5.35 M/UL (ref 4.2–5.4)
SODIUM SERPL-SCNC: 139 MMOL/L (ref 135–145)
TRIGL SERPL-MCNC: 91 MG/DL (ref 0–149)
WBC # BLD AUTO: 8.2 K/UL (ref 4.8–10.8)

## 2024-04-18 PROCEDURE — 80053 COMPREHEN METABOLIC PANEL: CPT

## 2024-04-18 PROCEDURE — 36415 COLL VENOUS BLD VENIPUNCTURE: CPT

## 2024-04-18 PROCEDURE — 83036 HEMOGLOBIN GLYCOSYLATED A1C: CPT | Mod: GA

## 2024-04-18 PROCEDURE — 85025 COMPLETE CBC W/AUTO DIFF WBC: CPT

## 2024-04-18 PROCEDURE — 80061 LIPID PANEL: CPT

## 2024-05-29 ENCOUNTER — TELEPHONE (OUTPATIENT)
Dept: HEALTH INFORMATION MANAGEMENT | Facility: OTHER | Age: 69
End: 2024-05-29
Payer: MEDICARE

## 2024-08-12 DIAGNOSIS — J44.9 CHRONIC OBSTRUCTIVE PULMONARY DISEASE, UNSPECIFIED COPD TYPE (HCC): ICD-10-CM

## 2024-08-12 RX ORDER — ALBUTEROL SULFATE 90 UG/1
2 AEROSOL, METERED RESPIRATORY (INHALATION) EVERY 4 HOURS PRN
Qty: 8.5 EACH | Refills: 1 | Status: SHIPPED | OUTPATIENT
Start: 2024-08-12

## 2024-08-13 NOTE — TELEPHONE ENCOUNTER
Requested Prescriptions     Signed Prescriptions Disp Refills    albuterol 108 (90 Base) MCG/ACT Aero Soln inhalation aerosol 8.5 Each 1     Sig: Inhale 2 Puffs every four hours as needed for Shortness of Breath.     Authorizing Provider: CRISTÓBAL VILLAFANA A.P.R.N.

## 2024-08-13 NOTE — TELEPHONE ENCOUNTER
Received request via: Patient    Was the patient seen in the last year in this department? Yes    Does the patient have an active prescription (recently filled or refills available) for medication(s) requested? No    Pharmacy Name: [facility]    Does the patient have longterm Plus and need 100-day supply? (This applies to ALL medications) Patient does not have SCP

## 2024-09-25 ENCOUNTER — HOSPITAL ENCOUNTER (OUTPATIENT)
Dept: LAB | Facility: MEDICAL CENTER | Age: 69
End: 2024-09-25
Attending: STUDENT IN AN ORGANIZED HEALTH CARE EDUCATION/TRAINING PROGRAM
Payer: MEDICARE

## 2024-09-25 LAB
ALBUMIN SERPL BCP-MCNC: 3.9 G/DL (ref 3.2–4.9)
ALBUMIN/GLOB SERPL: 1.4 G/DL
ALP SERPL-CCNC: 85 U/L (ref 30–99)
ALT SERPL-CCNC: 25 U/L (ref 2–50)
ANION GAP SERPL CALC-SCNC: 13 MMOL/L (ref 7–16)
AST SERPL-CCNC: 31 U/L (ref 12–45)
BASOPHILS # BLD AUTO: 0.3 % (ref 0–1.8)
BASOPHILS # BLD: 0.02 K/UL (ref 0–0.12)
BILIRUB SERPL-MCNC: 0.4 MG/DL (ref 0.1–1.5)
BUN SERPL-MCNC: 14 MG/DL (ref 8–22)
CALCIUM ALBUM COR SERPL-MCNC: 9.7 MG/DL (ref 8.5–10.5)
CALCIUM SERPL-MCNC: 9.6 MG/DL (ref 8.5–10.5)
CHLORIDE SERPL-SCNC: 106 MMOL/L (ref 96–112)
CO2 SERPL-SCNC: 25 MMOL/L (ref 20–33)
CREAT SERPL-MCNC: 1.14 MG/DL (ref 0.5–1.4)
EOSINOPHIL # BLD AUTO: 0.16 K/UL (ref 0–0.51)
EOSINOPHIL NFR BLD: 2.4 % (ref 0–6.9)
ERYTHROCYTE [DISTWIDTH] IN BLOOD BY AUTOMATED COUNT: 49.6 FL (ref 35.9–50)
EST. AVERAGE GLUCOSE BLD GHB EST-MCNC: 111 MG/DL
FOLATE SERPL-MCNC: 14.3 NG/ML
GFR SERPLBLD CREATININE-BSD FMLA CKD-EPI: 52 ML/MIN/1.73 M 2
GLOBULIN SER CALC-MCNC: 2.7 G/DL (ref 1.9–3.5)
GLUCOSE SERPL-MCNC: 97 MG/DL (ref 65–99)
HBA1C MFR BLD: 5.5 % (ref 4–5.6)
HCT VFR BLD AUTO: 48.6 % (ref 37–47)
HGB BLD-MCNC: 15.8 G/DL (ref 12–16)
IMM GRANULOCYTES # BLD AUTO: 0.02 K/UL (ref 0–0.11)
IMM GRANULOCYTES NFR BLD AUTO: 0.3 % (ref 0–0.9)
LYMPHOCYTES # BLD AUTO: 2.1 K/UL (ref 1–4.8)
LYMPHOCYTES NFR BLD: 31.4 % (ref 22–41)
MCH RBC QN AUTO: 30 PG (ref 27–33)
MCHC RBC AUTO-ENTMCNC: 32.5 G/DL (ref 32.2–35.5)
MCV RBC AUTO: 92.2 FL (ref 81.4–97.8)
MONOCYTES # BLD AUTO: 0.54 K/UL (ref 0–0.85)
MONOCYTES NFR BLD AUTO: 8.1 % (ref 0–13.4)
NEUTROPHILS # BLD AUTO: 3.85 K/UL (ref 1.82–7.42)
NEUTROPHILS NFR BLD: 57.5 % (ref 44–72)
NRBC # BLD AUTO: 0 K/UL
NRBC BLD-RTO: 0 /100 WBC (ref 0–0.2)
PLATELET # BLD AUTO: 270 K/UL (ref 164–446)
PMV BLD AUTO: 9.9 FL (ref 9–12.9)
POTASSIUM SERPL-SCNC: 3.7 MMOL/L (ref 3.6–5.5)
PROT SERPL-MCNC: 6.6 G/DL (ref 6–8.2)
RBC # BLD AUTO: 5.27 M/UL (ref 4.2–5.4)
SODIUM SERPL-SCNC: 144 MMOL/L (ref 135–145)
VIT B12 SERPL-MCNC: 481 PG/ML (ref 211–911)
WBC # BLD AUTO: 6.7 K/UL (ref 4.8–10.8)

## 2024-09-25 PROCEDURE — 82607 VITAMIN B-12: CPT

## 2024-09-25 PROCEDURE — 82746 ASSAY OF FOLIC ACID SERUM: CPT

## 2024-09-25 PROCEDURE — 85025 COMPLETE CBC W/AUTO DIFF WBC: CPT

## 2024-09-25 PROCEDURE — 80053 COMPREHEN METABOLIC PANEL: CPT

## 2024-09-25 PROCEDURE — 83036 HEMOGLOBIN GLYCOSYLATED A1C: CPT | Mod: GZ

## 2024-09-25 PROCEDURE — 36415 COLL VENOUS BLD VENIPUNCTURE: CPT

## 2024-12-02 DIAGNOSIS — K86.1 CHRONIC PANCREATITIS, UNSPECIFIED PANCREATITIS TYPE (HCC): ICD-10-CM

## 2024-12-03 NOTE — TELEPHONE ENCOUNTER
Dr. Thibodeaux awakes the patient. Patient states he wants to go home. Denies feeling \"down.\"    Refill denied. Please contact the patient to schedule new patient appointment for this refill.

## 2025-07-30 ENCOUNTER — HOSPITAL ENCOUNTER (OUTPATIENT)
Dept: LAB | Facility: MEDICAL CENTER | Age: 70
End: 2025-07-30
Attending: STUDENT IN AN ORGANIZED HEALTH CARE EDUCATION/TRAINING PROGRAM
Payer: MEDICARE

## 2025-07-30 LAB
ALBUMIN SERPL BCP-MCNC: 4.2 G/DL (ref 3.2–4.9)
ALBUMIN/GLOB SERPL: 1.6 G/DL
ALP SERPL-CCNC: 62 U/L (ref 30–99)
ALT SERPL-CCNC: 7 U/L (ref 2–50)
ANION GAP SERPL CALC-SCNC: 14 MMOL/L (ref 7–16)
AST SERPL-CCNC: 21 U/L (ref 12–45)
BASOPHILS # BLD AUTO: 0.3 % (ref 0–1.8)
BASOPHILS # BLD: 0.02 K/UL (ref 0–0.12)
BILIRUB SERPL-MCNC: 0.4 MG/DL (ref 0.1–1.5)
BUN SERPL-MCNC: 8 MG/DL (ref 8–22)
CALCIUM ALBUM COR SERPL-MCNC: 9.2 MG/DL (ref 8.5–10.5)
CALCIUM SERPL-MCNC: 9.4 MG/DL (ref 8.5–10.5)
CHLORIDE SERPL-SCNC: 105 MMOL/L (ref 96–112)
CHOLEST SERPL-MCNC: 161 MG/DL (ref 100–199)
CO2 SERPL-SCNC: 22 MMOL/L (ref 20–33)
CREAT SERPL-MCNC: 0.9 MG/DL (ref 0.5–1.4)
EOSINOPHIL # BLD AUTO: 0.14 K/UL (ref 0–0.51)
EOSINOPHIL NFR BLD: 2.4 % (ref 0–6.9)
ERYTHROCYTE [DISTWIDTH] IN BLOOD BY AUTOMATED COUNT: 53.6 FL (ref 35.9–50)
EST. AVERAGE GLUCOSE BLD GHB EST-MCNC: 114 MG/DL
FASTING STATUS PATIENT QL REPORTED: NORMAL
GFR SERPLBLD CREATININE-BSD FMLA CKD-EPI: 69 ML/MIN/1.73 M 2
GLOBULIN SER CALC-MCNC: 2.6 G/DL (ref 1.9–3.5)
GLUCOSE SERPL-MCNC: 107 MG/DL (ref 65–99)
HBA1C MFR BLD: 5.6 % (ref 4–5.6)
HCT VFR BLD AUTO: 49.5 % (ref 37–47)
HDLC SERPL-MCNC: 64 MG/DL
HGB BLD-MCNC: 15.9 G/DL (ref 12–16)
IMM GRANULOCYTES # BLD AUTO: 0.02 K/UL (ref 0–0.11)
IMM GRANULOCYTES NFR BLD AUTO: 0.3 % (ref 0–0.9)
LDLC SERPL CALC-MCNC: 73 MG/DL
LYMPHOCYTES # BLD AUTO: 1.97 K/UL (ref 1–4.8)
LYMPHOCYTES NFR BLD: 33.6 % (ref 22–41)
MCH RBC QN AUTO: 30.1 PG (ref 27–33)
MCHC RBC AUTO-ENTMCNC: 32.1 G/DL (ref 32.2–35.5)
MCV RBC AUTO: 93.6 FL (ref 81.4–97.8)
MONOCYTES # BLD AUTO: 0.42 K/UL (ref 0–0.85)
MONOCYTES NFR BLD AUTO: 7.2 % (ref 0–13.4)
NEUTROPHILS # BLD AUTO: 3.29 K/UL (ref 1.82–7.42)
NEUTROPHILS NFR BLD: 56.2 % (ref 44–72)
NRBC # BLD AUTO: 0 K/UL
NRBC BLD-RTO: 0 /100 WBC (ref 0–0.2)
PLATELET # BLD AUTO: 301 K/UL (ref 164–446)
PMV BLD AUTO: 10.3 FL (ref 9–12.9)
POTASSIUM SERPL-SCNC: 4.1 MMOL/L (ref 3.6–5.5)
PROT SERPL-MCNC: 6.8 G/DL (ref 6–8.2)
RBC # BLD AUTO: 5.29 M/UL (ref 4.2–5.4)
SODIUM SERPL-SCNC: 141 MMOL/L (ref 135–145)
TRIGL SERPL-MCNC: 120 MG/DL (ref 0–149)
WBC # BLD AUTO: 5.9 K/UL (ref 4.8–10.8)

## 2025-07-30 PROCEDURE — 85025 COMPLETE CBC W/AUTO DIFF WBC: CPT

## 2025-07-30 PROCEDURE — 80053 COMPREHEN METABOLIC PANEL: CPT

## 2025-07-30 PROCEDURE — 36415 COLL VENOUS BLD VENIPUNCTURE: CPT

## 2025-07-30 PROCEDURE — 83036 HEMOGLOBIN GLYCOSYLATED A1C: CPT | Mod: GA

## 2025-07-30 PROCEDURE — 80061 LIPID PANEL: CPT
